# Patient Record
Sex: MALE | Race: WHITE | Employment: FULL TIME | URBAN - NONMETROPOLITAN AREA
[De-identification: names, ages, dates, MRNs, and addresses within clinical notes are randomized per-mention and may not be internally consistent; named-entity substitution may affect disease eponyms.]

---

## 2019-04-08 ENCOUNTER — HOSPITAL ENCOUNTER (EMERGENCY)
Age: 29
Discharge: HOME | End: 2019-04-08
Attending: EMERGENCY MEDICINE
Payer: COMMERCIAL

## 2019-04-08 VITALS
HEART RATE: 115 BPM | DIASTOLIC BLOOD PRESSURE: 85 MMHG | WEIGHT: 150 LBS | HEIGHT: 68 IN | OXYGEN SATURATION: 100 % | RESPIRATION RATE: 16 BRPM | SYSTOLIC BLOOD PRESSURE: 156 MMHG | BODY MASS INDEX: 22.73 KG/M2 | TEMPERATURE: 99.1 F

## 2019-04-08 DIAGNOSIS — B00.9 HERPES SIMPLEX: Primary | ICD-10-CM

## 2019-04-08 PROCEDURE — 87253 VIRUS INOCULATE TISSUE ADDL: CPT

## 2019-04-08 PROCEDURE — 6370000000 HC RX 637 (ALT 250 FOR IP): Performed by: EMERGENCY MEDICINE

## 2019-04-08 PROCEDURE — 99283 EMERGENCY DEPT VISIT LOW MDM: CPT

## 2019-04-08 PROCEDURE — 87252 VIRUS INOCULATION TISSUE: CPT

## 2019-04-08 RX ORDER — DEXTROAMPHETAMINE SACCHARATE, AMPHETAMINE ASPARTATE MONOHYDRATE, DEXTROAMPHETAMINE SULFATE AND AMPHETAMINE SULFATE 5; 5; 5; 5 MG/1; MG/1; MG/1; MG/1
CAPSULE, EXTENDED RELEASE ORAL
COMMUNITY
Start: 2019-02-06

## 2019-04-08 RX ORDER — VORTIOXETINE 10 MG/1
TABLET, FILM COATED ORAL
COMMUNITY
Start: 2019-04-04 | End: 2022-07-12

## 2019-04-08 RX ORDER — VALACYCLOVIR HYDROCHLORIDE 1 G/1
1000 TABLET, FILM COATED ORAL 2 TIMES DAILY
Qty: 20 TABLET | Refills: 0 | Status: SHIPPED | OUTPATIENT
Start: 2019-04-08 | End: 2019-04-18

## 2019-04-08 RX ORDER — ACYCLOVIR 200 MG/1
200 CAPSULE ORAL ONCE
Status: COMPLETED | OUTPATIENT
Start: 2019-04-08 | End: 2019-04-08

## 2019-04-08 RX ADMIN — ACYCLOVIR 200 MG: 200 CAPSULE ORAL at 22:18

## 2019-04-08 ASSESSMENT — PAIN DESCRIPTION - PAIN TYPE: TYPE: ACUTE PAIN

## 2019-04-08 ASSESSMENT — PAIN SCALES - GENERAL: PAINLEVEL_OUTOF10: 6

## 2019-04-08 ASSESSMENT — PAIN DESCRIPTION - LOCATION: LOCATION: GROIN

## 2019-04-08 ASSESSMENT — PAIN DESCRIPTION - DESCRIPTORS: DESCRIPTORS: CONSTANT;THROBBING

## 2019-04-08 ASSESSMENT — PAIN DESCRIPTION - ORIENTATION: ORIENTATION: LEFT

## 2019-04-09 NOTE — ED PROVIDER NOTES
Triage Chief Complaint:   No chief complaint on file. [place]:  Henry Ray is a 34 y.o. male that presents with a cluster of blisters on his penis. His blisters are painful and he has left inguinal lymphadenopathy as well. Symptoms began yesterday and seemed to be somewhat worse today. He did not have urethral discharge and does not have a history of STDs in the past.  He denies any recent sexual contact. Patient is otherwise healthy with hypertension as his only ongoing medical problem. ROS:  Review of systems was reviewed for 10 systems and is otherwise negative except as in the [address]    Past Medical History:   Diagnosis Date    Hypertension      Past Surgical History:   Procedure Laterality Date    NOSE SURGERY       History reviewed. No pertinent family history.   Social History     Socioeconomic History    Marital status: Single     Spouse name: Not on file    Number of children: Not on file    Years of education: Not on file    Highest education level: Not on file   Occupational History    Not on file   Social Needs    Financial resource strain: Not on file    Food insecurity:     Worry: Not on file     Inability: Not on file    Transportation needs:     Medical: Not on file     Non-medical: Not on file   Tobacco Use    Smoking status: Current Every Day Smoker     Packs/day: 1.00     Types: Cigarettes    Smokeless tobacco: Never Used   Substance and Sexual Activity    Alcohol use: Yes     Comment: rarely    Drug use: No    Sexual activity: Yes     Partners: Female   Lifestyle    Physical activity:     Days per week: Not on file     Minutes per session: Not on file    Stress: Not on file   Relationships    Social connections:     Talks on phone: Not on file     Gets together: Not on file     Attends Anabaptism service: Not on file     Active member of club or organization: Not on file     Attends meetings of clubs or organizations: Not on file     Relationship status: Not on file  Intimate partner violence:     Fear of current or ex partner: Not on file     Emotionally abused: Not on file     Physically abused: Not on file     Forced sexual activity: Not on file   Other Topics Concern    Not on file   Social History Narrative    Not on file     No current facility-administered medications for this encounter. Current Outpatient Medications   Medication Sig Dispense Refill    valACYclovir (VALTREX) 1 g tablet Take 1 tablet by mouth 2 times daily for 10 days 20 tablet 0    amphetamine-dextroamphetamine (ADDERALL XR) 20 MG extended release capsule       TRINTELLIX 10 MG TABS tablet        Allergies   Allergen Reactions    Lisinopril Other (See Comments)     cough    Ampicillin Rash     Nursing Notes Reviewed    Physical Exam:  ED Triage Vitals [04/08/19 2112]   Enc Vitals Group      BP (!) 156/85      Pulse 115      Resp 16      Temp 99.1 °F (37.3 °C)      Temp Source Oral      SpO2 100 %      Weight 150 lb (68 kg)      Height 5' 8\" (1.727 m)      Head Circumference       Peak Flow       Pain Score       Pain Loc       Pain Edu? Excl. in [address]? GENERAL APPEARANCE: A well-developed well-nourished anxious 78-year-old male in mild distress  HEAD: Normocephalic, atraumatic  : Normal circumcised male, a cluster of 1 mm blisters noted on the left shaft of the penis, no urethral discharge was noted. He did have left inguinal adenopathy but no evidence of an inguinal hernia, no testicular pain or swelling, and no other genital perineal skin lesions   SKIN: Warm and dry. Normal color, no rash,  capillary refill less than 2 seconds  MENTAL STATUS: Alert, oriented, interactive,     Nursing note and vital signs reviewed     I have reviewed and interpreted all of the currently available lab results from this visit (if applicable):  No results found for this visit on 04/08/19. Radiographs (if obtained):  ? Radiologist's Report Reviewed:  No orders to display       ?  Discussed with Radiologist:     ? The following radiograph was interpreted by myself in the absence of a radiologist:     EKG (if obtained): (All EKG's are interpreted by myself in the absence of a cardiologist)      MDM:   Patient with what clinically appears to be herpes simplex II. Culture was sent and he'll be treated with Valtrex twice a day and followed up with his family physician. No other STD was identified    Final Impression:  1. Herpes simplex        Critical Care:       Disposition referral (if applicable):  Gregg Hartmann MD  [phone]    In 1 day        Disposition medications (if applicable):  New Prescriptions    VALACYCLOVIR (VALTREX) 1 G TABLET    Take 1 tablet by mouth 2 times daily for 10 days       Comment: Please note this report has been produced using speech recognition software and may contain errors related to that system including errors in grammar, punctuation, and spelling, as well as words and phrases that may be inappropriate. If there are any questions or concerns please feel free to contact the dictating provider for clarification.       (Please note that portions of this note may have been completed with a voice recognition program. Efforts were made to edit the dictations but occasionally words are mis-transcribed.)    MD Anupam Garcia., MD  04/08/19 2204

## 2019-04-12 LAB
FINAL REPORT: NORMAL
PRELIMINARY: NORMAL

## 2022-02-24 ENCOUNTER — APPOINTMENT (OUTPATIENT)
Dept: GENERAL RADIOLOGY | Age: 32
End: 2022-02-24
Payer: COMMERCIAL

## 2022-02-24 ENCOUNTER — HOSPITAL ENCOUNTER (EMERGENCY)
Age: 32
Discharge: HOME | End: 2022-02-24
Payer: COMMERCIAL

## 2022-02-24 VITALS
DIASTOLIC BLOOD PRESSURE: 105 MMHG | HEIGHT: 68 IN | OXYGEN SATURATION: 100 % | TEMPERATURE: 96.8 F | HEART RATE: 81 BPM | RESPIRATION RATE: 16 BRPM | WEIGHT: 155 LBS | BODY MASS INDEX: 23.49 KG/M2 | SYSTOLIC BLOOD PRESSURE: 157 MMHG

## 2022-02-24 DIAGNOSIS — S92.335A CLOSED NONDISPLACED FRACTURE OF THIRD METATARSAL BONE OF LEFT FOOT, INITIAL ENCOUNTER: Primary | ICD-10-CM

## 2022-02-24 DIAGNOSIS — S61.212A LACERATION OF RIGHT MIDDLE FINGER WITHOUT FOREIGN BODY WITHOUT DAMAGE TO NAIL, INITIAL ENCOUNTER: ICD-10-CM

## 2022-02-24 DIAGNOSIS — S92.345A CLOSED NONDISPLACED FRACTURE OF FOURTH METATARSAL BONE OF LEFT FOOT, INITIAL ENCOUNTER: ICD-10-CM

## 2022-02-24 PROCEDURE — 99284 EMERGENCY DEPT VISIT MOD MDM: CPT

## 2022-02-24 PROCEDURE — 73630 X-RAY EXAM OF FOOT: CPT

## 2022-02-24 PROCEDURE — 6370000000 HC RX 637 (ALT 250 FOR IP): Performed by: NURSE PRACTITIONER

## 2022-02-24 PROCEDURE — 73552 X-RAY EXAM OF FEMUR 2/>: CPT

## 2022-02-24 PROCEDURE — 73130 X-RAY EXAM OF HAND: CPT

## 2022-02-24 RX ORDER — HYDROCODONE BITARTRATE AND ACETAMINOPHEN 5; 325 MG/1; MG/1
1 TABLET ORAL ONCE
Status: COMPLETED | OUTPATIENT
Start: 2022-02-24 | End: 2022-02-24

## 2022-02-24 RX ORDER — HYDROCODONE BITARTRATE AND ACETAMINOPHEN 5; 325 MG/1; MG/1
1 TABLET ORAL EVERY 6 HOURS PRN
Qty: 12 TABLET | Refills: 0 | Status: SHIPPED | OUTPATIENT
Start: 2022-02-24 | End: 2022-02-27

## 2022-02-24 RX ADMIN — HYDROCODONE BITARTRATE AND ACETAMINOPHEN 1 TABLET: 5; 325 TABLET ORAL at 12:42

## 2022-02-24 ASSESSMENT — ENCOUNTER SYMPTOMS
VOMITING: 0
SHORTNESS OF BREATH: 0
NAUSEA: 0
DIARRHEA: 0
SORE THROAT: 0
COUGH: 0
ABDOMINAL PAIN: 0
EYE PAIN: 0
BLOOD IN STOOL: 0
RHINORRHEA: 0
BACK PAIN: 0

## 2022-02-24 ASSESSMENT — PAIN SCALES - GENERAL
PAINLEVEL_OUTOF10: 7
PAINLEVEL_OUTOF10: 7
PAINLEVEL_OUTOF10: 5
PAINLEVEL_OUTOF10: 6

## 2022-02-24 ASSESSMENT — PAIN DESCRIPTION - DESCRIPTORS: DESCRIPTORS: BURNING

## 2022-02-24 ASSESSMENT — PAIN DESCRIPTION - LOCATION: LOCATION: LEG

## 2022-02-24 ASSESSMENT — PAIN DESCRIPTION - PAIN TYPE: TYPE: ACUTE PAIN

## 2022-02-24 ASSESSMENT — PAIN DESCRIPTION - ORIENTATION: ORIENTATION: LEFT

## 2022-02-24 NOTE — ED NOTES
Patient discharged in stable, ambulatory condition with all documented belongings. This nurse reviewed discharge instructions, pt verbalized understanding.        Saurabh Lundy RN  02/24/22 4870

## 2022-02-24 NOTE — ED NOTES
Perfect serve sent to [facility]     Call was returned by [facility] and spoke to Clark Crocker  02/24/22 2534

## 2022-02-24 NOTE — ED TRIAGE NOTES
Chief Complaint   Patient presents with    Hand Laceration     pt states machine dropped a several hundred pound piece of metal onto left leg and foot, also cut right hand, c/o pain in left leg    Leg Injury

## 2022-02-24 NOTE — Clinical Note
Sebastian Lobo was seen and treated in our emergency department on 2/24/2022. He may return to work on 03/03/2022. If you have any questions or concerns, please don't hesitate to call.       Roz Recinos, MIGUEL ANGEL - CNP

## 2022-02-24 NOTE — ED PROVIDER NOTES
[address] ED  EMERGENCY DEPARTMENT ENCOUNTER        Pt Name: Ayesha Malloy  MRN: [id number]  [place] 1990  Date of evaluation: 2/24/2022  Provider: MIGUEL ANGEL Joshi CNP  PCP: No primary care provider on file. Note Started: 12:28 PM EST      SIMONA. I have evaluated this patient. My supervising physician was available for consultation. Triage CHIEF COMPLAINT       Chief Complaint   Patient presents with    Hand Laceration     pt states machine dropped a several hundred pound piece of metal onto left leg and foot, also cut right hand, c/o pain in left leg    Leg Injury         HISTORY OF PRESENT ILLNESS   (Location/Symptom, Timing/Onset, Context/Setting, Quality, Duration, Modifying Factors, Severity)  Note limiting factors. Chief Complaint: Left leg pain and laceration to the right hand    Ayesha Malloy is a 28 y.o. male who presents to the emergency department with symptoms of left leg pain especially in the left foot after he was working today. Apparently a large piece of sheet metal fell from a magnetized lift causing him to land on his left foot. Patient states he now has a sense of tenderness across the dorsum of the foot. He also states that the object struck him in the thigh she has some mild tenderness in his left lower thigh. Patient states that he suffered some abrasions to the thigh and some bruising to the foot. He also suffered a small laceration involving the middle digit of the right hand. Nursing Notes were all reviewed and agreed with or any disagreements were addressed in the HPI. REVIEW OF SYSTEMS    (2-9 systems for level 4, 10 or more for level 5)     Review of Systems   Constitutional: Negative for chills, diaphoresis and fever. HENT: Negative for congestion, ear pain, rhinorrhea and sore throat. Eyes: Negative for pain and visual disturbance. Respiratory: Negative for cough and shortness of breath.     Cardiovascular: Negative for chest pain and leg swelling. Gastrointestinal: Negative for abdominal pain, blood in stool, diarrhea, nausea and vomiting. Genitourinary: Negative for difficulty urinating, dysuria, flank pain and frequency. Musculoskeletal: Negative for back pain and neck pain. Left foot pain and laceration to the middle digit of right hand    Skin: Negative for rash and wound. Neurological: Negative for dizziness and light-headedness. PAST MEDICAL HISTORY     Past Medical History:   Diagnosis Date    Hypertension        SURGICAL HISTORY     Past Surgical History:   Procedure Laterality Date    NOSE SURGERY         CURRENTMEDICATIONS       Discharge Medication List as of 2/24/2022  2:32 PM      CONTINUE these medications which have NOT CHANGED    Details   amphetamine-dextroamphetamine (ADDERALL XR) 20 MG extended release capsule Historical Med      TRINTELLIX 10 MG TABS tablet DAWHistorical Med             ALLERGIES     Amoxicillin, Lisinopril, and Ampicillin    FAMILYHISTORY     History reviewed. No pertinent family history.      SOCIAL HISTORY       Social History     Socioeconomic History    Marital status: Single     Spouse name: None    Number of children: None    Years of education: None    Highest education level: None   Occupational History    None   Tobacco Use    Smoking status: Current Every Day Smoker     Packs/day: 1.00     Types: Cigarettes    Smokeless tobacco: Never Used   Vaping Use    Vaping Use: Never used   Substance and Sexual Activity    Alcohol use: Not Currently     Comment: rarely    Drug use: No    Sexual activity: Yes     Partners: Female   Other Topics Concern    None   Social History Narrative    None     Social Determinants of Health     Financial Resource Strain:     Difficulty of Paying Living Expenses: Not on file   Food Insecurity:     Worried About Running Out of Food in the Last Year: Not on file    Alex of Food in the Last Year: Not on file Transportation Needs:     Lack of Transportation (Medical): Not on file    Lack of Transportation (Non-Medical): Not on file   Physical Activity:     Days of Exercise per Week: Not on file    Minutes of Exercise per Session: Not on file   Stress:     Feeling of Stress : Not on file   Social Connections:     Frequency of Communication with Friends and Family: Not on file    Frequency of Social Gatherings with Friends and Family: Not on file    Attends Jew Services: Not on file    Active Member of [address] [place] [address] or Organizations: Not on file    Attends Club or Organization Meetings: Not on file    Marital Status: Not on file   Intimate Partner Violence:     Fear of Current or Ex-Partner: Not on file    Emotionally Abused: Not on file    Physically Abused: Not on file    Sexually Abused: Not on file   Housing Stability:     Unable to Pay for Housing in the Last Year: Not on file    Number of [place] in the Last Year: Not on file    Unstable Housing in the Last Year: Not on file       SCREENINGS    [place] Coma Scale  Eye Opening: Spontaneous  Best Verbal Response: Oriented  Best Motor Response: Obeys commands  [place] Coma Scale Score: 15        PHYSICAL EXAM    (up to 7 for level 4, 8 or more for level 5)     ED Triage Vitals   BP Temp Temp Source Pulse Resp SpO2 Height Weight   02/24/22 1154 02/24/22 1153 02/24/22 1153 02/24/22 1153 02/24/22 1153 02/24/22 1153 02/24/22 1153 02/24/22 1153   (!) 159/100 96.8 °F (36 °C) Oral 85 16 100 % 5' 8\" (1.727 m) 155 lb (70.3 kg)       Physical Exam  Vitals and nursing note reviewed. Constitutional:       Appearance: Normal appearance. He is not toxic-appearing or diaphoretic. HENT:      Head: Normocephalic and atraumatic. Nose: Nose normal.      Mouth/Throat:      Mouth: Mucous membranes are moist.   Eyes:      General:         Right eye: No discharge. Left eye: No discharge.    Cardiovascular:      Rate and Rhythm: Normal rate and regular rhythm. Pulses: Normal pulses. Heart sounds: No murmur heard. Pulmonary:      Effort: Pulmonary effort is normal. No respiratory distress. Breath sounds: No wheezing or rhonchi. Abdominal:      Palpations: Abdomen is soft. Tenderness: There is no abdominal tenderness. There is no guarding or rebound. Musculoskeletal:         General: Swelling and tenderness present. Normal range of motion. Cervical back: Normal range of motion and neck supple. Left upper leg: Tenderness present. Left foot: Swelling, tenderness and bony tenderness present. No deformity or laceration. Normal pulse. Comments: Patient has 2+ dorsalis pedis and posterior tibial pulses involving the lower extremity. Patient has full range of motion of the digits of the toes. He has no numbness or tingling in the toes. No nail injury. Skin:     General: Skin is warm and dry. Neurological:      General: No focal deficit present. Mental Status: He is alert and oriented to person, place, and time. Psychiatric:         Mood and Affect: Mood normal.         Behavior: Behavior normal.         DIAGNOSTIC RESULTS   LABS:    Labs Reviewed - No data to display    When ordered, only abnormal lab results are displayed. All other labs were within normal range or not returned as of this dictation. EKG: When ordered, EKG's are interpreted by the Emergency Department Physician in the absence of a cardiologist.  Please see their note for interpretation of EKG.       RADIOLOGY:   Non-plain film images such as CT, Ultrasound and MRI are read by the radiologist. Plain radiographic images are visualized andpreliminarily interpreted by the  ED Provider with the below findings:        Interpretation perthe Radiologist below, if available at the time of this note:    XR HAND RIGHT (MIN 3 VIEWS)   Final Result   Soft tissue defect overlying the 3rd middle phalanx without underlying   fracture or radiopaque foreign body.         XR FEMUR LEFT (MIN 2 VIEWS)   Final Result   No acute osseous abnormality. XR FOOT LEFT (MIN 3 VIEWS)   Final Result   Nondisplaced fractures of the 3rd and 4th metatarsal necks. No results found. PROCEDURES   Unless otherwise noted below, none     Procedures    CRITICAL CARE TIME   N/A    CONSULTS:  IP CONSULT TO ORTHOPEDIC SURGERY      EMERGENCY DEPARTMENT COURSE and DIFFERENTIAL DIAGNOSIS/MDM:   Vitals:    Vitals:    02/24/22 1153 02/24/22 1154 02/24/22 1442   BP:  (!) 159/100 (!) 157/105   Pulse: 85  81   Resp: 16  16   Temp: 96.8 °F (36 °C)     TempSrc: Oral     SpO2: 100%  100%   Weight: 155 lb (70.3 kg)     Height: 5' 8\" (1.727 m)         Patient was given thefollowing medications:  Medications   HYDROcodone-acetaminophen (NORCO) 5-325 MG per tablet 1 tablet (1 tablet Oral Given 2/24/22 1242)           I had a detailed discussion with Mr. Anny Alcaraz who is the PA works an orthopedist.  The orthopedist advised that at this time patient can be discharged home. He advised that at this time he would go and place the patient in a walking boot. He advised that the patient does not need to be placed in a splint and if we have walking boots that would be preferred. He advised that patient can be followed up in the next few days by orthopedist.  Patient was provided a Ortho follow-up and provided follow-up with his family doctor. Patient displays no other acute complaints at this time states her last feeling well. The laceration finger was cleansed, irrigated, closed. The patient's fractures of foot display no associated bleeding, open injury, or further concerns for compartment syndrome. FINAL IMPRESSION      1. Closed nondisplaced fracture of third metatarsal bone of left foot, initial encounter    2. Closed nondisplaced fracture of fourth metatarsal bone of left foot, initial encounter    3.  Laceration of right middle finger without foreign body without damage to nail, initial encounter          DISPOSITION/PLAN   DISPOSITION Decision To Discharge 02/24/2022 02:20:23 PM      PATIENT REFERREDTO:  Bin Miranda MD  [address] [place] [address]  [phone]    Schedule an appointment as soon as possible for a visit       Primary care provider  Follow with your family doctor in the next 2 to 3 days for reevaluation of the foot and hand  Follow up with your primary care provider in the next 10 days for suture removal  Schedule an appointment as soon as possible for a visit         DISCHARGE MEDICATIONS:  Discharge Medication List as of 2/24/2022  2:32 PM      START taking these medications    Details   HYDROcodone-acetaminophen (NORCO) 5-325 MG per tablet Take 1 tablet by mouth every 6 hours as needed for Pain for up to 3 days. Intended supply: 3 days.  Take lowest dose possible to manage pain, Disp-12 tablet, R-0Normal             DISCONTINUED MEDICATIONS:  Discharge Medication List as of 2/24/2022  2:32 PM                 (Please note that portions ofthis note were completed with a voice recognition program.  Efforts were made to edit the dictations but occasionally words are mis-transcribed.)    MIGUEL ANGEL Brock CNP (electronically signed)            MIGUEL ANGEL Brock CNP  02/25/22 9014

## 2022-03-01 ENCOUNTER — OFFICE VISIT (OUTPATIENT)
Dept: ORTHOPEDIC SURGERY | Age: 32
End: 2022-03-01
Payer: COMMERCIAL

## 2022-03-01 VITALS — HEIGHT: 68 IN | BODY MASS INDEX: 23.57 KG/M2

## 2022-03-01 DIAGNOSIS — S70.12XA HEMATOMA OF LEFT THIGH, INITIAL ENCOUNTER: ICD-10-CM

## 2022-03-01 DIAGNOSIS — S92.342A CLOSED DISPLACED FRACTURE OF FOURTH METATARSAL BONE OF LEFT FOOT, INITIAL ENCOUNTER: ICD-10-CM

## 2022-03-01 DIAGNOSIS — S61.212A LACERATION OF RIGHT MIDDLE FINGER WITHOUT FOREIGN BODY WITHOUT DAMAGE TO NAIL, INITIAL ENCOUNTER: ICD-10-CM

## 2022-03-01 DIAGNOSIS — S92.332A DISPLACED FRACTURE OF THIRD METATARSAL BONE, LEFT FOOT, INITIAL ENCOUNTER FOR CLOSED FRACTURE: Primary | ICD-10-CM

## 2022-03-01 DIAGNOSIS — F17.200 CURRENT SMOKER: ICD-10-CM

## 2022-03-01 DIAGNOSIS — S97.82XA CRUSH INJURY OF FOOT, LEFT, INITIAL ENCOUNTER: ICD-10-CM

## 2022-03-01 PROCEDURE — 99406 BEHAV CHNG SMOKING 3-10 MIN: CPT | Performed by: ORTHOPAEDIC SURGERY

## 2022-03-01 PROCEDURE — 28470 CLTX METATARSAL FX WO MNP EA: CPT | Performed by: ORTHOPAEDIC SURGERY

## 2022-03-01 PROCEDURE — 99203 OFFICE O/P NEW LOW 30 MIN: CPT | Performed by: ORTHOPAEDIC SURGERY

## 2022-03-01 RX ORDER — CLINDAMYCIN HYDROCHLORIDE 300 MG/1
300 CAPSULE ORAL 4 TIMES DAILY
Qty: 40 CAPSULE | Refills: 0 | Status: SHIPPED | OUTPATIENT
Start: 2022-03-01 | End: 2022-03-11

## 2022-03-01 NOTE — LETTER
[address] [place] [address] Dr Morris [facility] [address]  Phone: [phone]  Fax: 1991 Love Shane MD        March 1, 2022     Patient: Zeny Johnson   YOB: 1990   Date of Visit: 3/1/2022       To Whom It May Concern: It is my medical opinion that Yarely Jones may return to work on 3-16-22 (off for 2 weeks). If you have any questions or concerns, please don't hesitate to call.     Sincerely,        Mode Michel MD

## 2022-03-04 ENCOUNTER — TELEPHONE (OUTPATIENT)
Dept: ORTHOPEDIC SURGERY | Age: 32
End: 2022-03-04

## 2022-03-04 NOTE — TELEPHONE ENCOUNTER
Faxed completed questionnaire to [facility] @ [phone] attn:  Elba Alfie    Received a request for records from [facility]. Waiting for 3/1/2022 dictation to drop into [facility].

## 2022-03-05 PROBLEM — F17.200 CURRENT SMOKER: Status: ACTIVE | Noted: 2022-03-05

## 2022-03-05 PROBLEM — S92.332A DISPLACED FRACTURE OF THIRD METATARSAL BONE, LEFT FOOT, INITIAL ENCOUNTER FOR CLOSED FRACTURE: Status: ACTIVE | Noted: 2022-03-05

## 2022-03-05 PROBLEM — S70.12XA HEMATOMA OF LEFT THIGH: Status: ACTIVE | Noted: 2022-03-05

## 2022-03-05 PROBLEM — S61.212A LACERATION OF RIGHT MIDDLE FINGER WITHOUT FOREIGN BODY WITHOUT DAMAGE TO NAIL: Status: ACTIVE | Noted: 2022-03-05

## 2022-03-05 PROBLEM — S92.342A CLOSED DISPLACED FRACTURE OF FOURTH METATARSAL BONE OF LEFT FOOT: Status: ACTIVE | Noted: 2022-03-05

## 2022-03-05 PROBLEM — S97.82XA CRUSH INJURY OF FOOT, LEFT, INITIAL ENCOUNTER: Status: ACTIVE | Noted: 2022-03-05

## 2022-03-05 NOTE — PROGRESS NOTES
CHIEF COMPLAINT:   1- Left foot pain/ 3rd and 4th MT neck minimally displaced fracture. 2- Crush injury of left foot. 3- Left thigh hematoma. 4- Right long finger volar laceration. DATE OF INJURY:  2/24/2022, Worker's Comp. HISTORY:  Mr. Ny Flores 28 y.o.   male  presents today for the first visit for evaluation of a left foot and thigh injury and also right long finger laceration which occurred when he was at at work and heavy magnet about 400-500 lb fell on him. He is complaining of left forefoot and thigh pain and swelling and also right long finger laceration. Rates pain a 8/10 VAS. This is better with elevation and worse with bearing any wt. The pain is sharp and not radiating. No other complaint. He was [facility] 1st at [facility], where he was x-rayed and splinted and asked to f/u with orthopaedics. Past Medical History:   Diagnosis Date    Hypertension        Past Surgical History:   Procedure Laterality Date    NOSE SURGERY         Social History     Socioeconomic History    Marital status: Single     Spouse name: Not on file    Number of children: Not on file    Years of education: Not on file    Highest education level: Not on file   Occupational History    Not on file   Tobacco Use    Smoking status: Current Every Day Smoker     Packs/day: 1.00     Types: Cigarettes    Smokeless tobacco: Never Used   Vaping Use    Vaping Use: Never used   Substance and Sexual Activity    Alcohol use: Yes     Comment: rarely    Drug use: No    Sexual activity: Yes     Partners: Female   Other Topics Concern    Not on file   Social History Narrative    Not on file     Social Determinants of Health     Financial Resource Strain:     Difficulty of Paying Living Expenses: Not on file   Food Insecurity:     Worried About Running Out of Food in the Last Year: Not on file    Alex of Food in the Last Year: Not on file   Transportation Needs:     Lack of Transportation (Medical):  Not on file  Lack of Transportation (Non-Medical): Not on file   Physical Activity:     Days of Exercise per Week: Not on file    Minutes of Exercise per Session: Not on file   Stress:     Feeling of Stress : Not on file   Social Connections:     Frequency of Communication with Friends and Family: Not on file    Frequency of Social Gatherings with Friends and Family: Not on file    Attends Buddhist Services: Not on file    Active Member of [address] [place] [address] or Organizations: Not on file    Attends Club or Organization Meetings: Not on file    Marital Status: Not on file   Intimate Partner Violence:     Fear of Current or Ex-Partner: Not on file    Emotionally Abused: Not on file    Physically Abused: Not on file    Sexually Abused: Not on file   Housing Stability:     Unable to Pay for Housing in the Last Year: Not on file    Number of [place] in the Last Year: Not on file    Unstable Housing in the Last Year: Not on file       History reviewed. No pertinent family history. Current Outpatient Medications on File Prior to Visit   Medication Sig Dispense Refill    amphetamine-dextroamphetamine (ADDERALL XR) 20 MG extended release capsule       TRINTELLIX 10 MG TABS tablet        No current facility-administered medications on file prior to visit. Pertinent items are noted in HPI  Review of systems reviewed from Patient History Form and available in the patient's chart under the Media tab. PHYSICAL EXAMINATION:  Mr. Edi Jordan is a very pleasant 28 y.o.  male who presents today in no acute distress, awake, alert, and oriented. He is well dressed, nourished and  groomed. Patient with normal affect. Height is  5' 8\" (1.727 m), weight is  , Body mass index is 23.57 kg/m². Resting respiratory rate is 16. Examination of the gait, showed that the patient walks with a crutch, PWB left leg.   Examination of both ankles showing a decreased range of motion of the left ankle and knee compare to the other side because of foot pain. There is moderate swelling that can be seen, as well as ecchymosis over dorsal side of the left foot and anterior thigh. He  has intact sensation and good pedal pulses. He has significant tenderness on deep palpation over the 3rd and 4th MT neck area left foot. Left hand with volar sutured laceration long finger, intact sensation and good cap refill. IMAGING: Christina Sanchez were reviewed, dated 2/24/2022, 3 views of the left foot, and showed a minimally displaced 3rd and 4th MT neck fracture. X-rays were taken in the ED 2/24/2022, 3 views of the right hand and 2 views left femur, and showed no fracture. No other abnormality. IMPRESSION:   1- Left foot pain/ 3rd and 4th MT neck minimally displaced fracture. 2- Crush injury of left foot. 3- Left thigh hematoma. 4- Right long finger volar laceration. PLAN: I discussed that the overall alignment of these fractures are good and that we can try to treat this non-operatively in a boot with WB. We discussed the risk of infection, nonunion and  malunion. Clindamycin PO Rx sent. We will see him  back next week at which time we will recheck finger laceration. The patient smokes cigarettes, and we discussed with the patient the risks of smoking on general health and also on bone and soft tissue healing (delay and non-union), and promised to cut down or stop smoking. Smoking: Educated the patient regarding the hazards of smoking and that it harms their body in many ways. It increases the chance of developing heart disease, lung disease, cancer, and other health problems including poor bone and wound healing. The importance of smoking cessation for optimal bone and wound healing was stressed. This was communicated verbally, 5 Minutes.       Tripp Ambriz MD

## 2022-03-08 ENCOUNTER — OFFICE VISIT (OUTPATIENT)
Dept: ORTHOPEDIC SURGERY | Age: 32
End: 2022-03-08
Payer: COMMERCIAL

## 2022-03-08 VITALS — WEIGHT: 155 LBS | BODY MASS INDEX: 23.49 KG/M2 | HEIGHT: 68 IN

## 2022-03-08 DIAGNOSIS — S61.212A LACERATION OF RIGHT MIDDLE FINGER WITHOUT FOREIGN BODY WITHOUT DAMAGE TO NAIL, INITIAL ENCOUNTER: ICD-10-CM

## 2022-03-08 DIAGNOSIS — S70.12XA HEMATOMA OF LEFT THIGH, INITIAL ENCOUNTER: ICD-10-CM

## 2022-03-08 DIAGNOSIS — S97.82XA CRUSH INJURY OF FOOT, LEFT, INITIAL ENCOUNTER: ICD-10-CM

## 2022-03-08 DIAGNOSIS — S92.332A DISPLACED FRACTURE OF THIRD METATARSAL BONE, LEFT FOOT, INITIAL ENCOUNTER FOR CLOSED FRACTURE: Primary | ICD-10-CM

## 2022-03-08 DIAGNOSIS — F17.200 CURRENT SMOKER: ICD-10-CM

## 2022-03-08 PROCEDURE — 99406 BEHAV CHNG SMOKING 3-10 MIN: CPT | Performed by: ORTHOPAEDIC SURGERY

## 2022-03-08 PROCEDURE — 99024 POSTOP FOLLOW-UP VISIT: CPT | Performed by: ORTHOPAEDIC SURGERY

## 2022-03-08 PROCEDURE — 99214 OFFICE O/P EST MOD 30 MIN: CPT | Performed by: ORTHOPAEDIC SURGERY

## 2022-03-08 NOTE — LETTER
[address] [place] [address] [facility] [address]  Phone: [phone]  Fax: 1991 Love Shane MD        March 8, 2022     Patient: Ronald Peters   YOB: 1990   Date of Visit: 3/8/2022       To Whom It May Concern: It is my medical opinion that Juan Miguel Moulton should remain out of work until 3/15/2022. Patient can then return to work light duty for 2 weeks. If you have any questions or concerns, please don't hesitate to call.     Sincerely,        Katarina Anthony MD

## 2022-03-09 ENCOUNTER — TELEPHONE (OUTPATIENT)
Dept: ORTHOPEDIC SURGERY | Age: 32
End: 2022-03-09

## 2022-03-09 NOTE — TELEPHONE ENCOUNTER
Other  [id number] update Marcella Loomis [id number] request for RESTRICTIONS explained per [employer]    Please notify Teetee Lockett in Workers Comp when complete

## 2022-03-13 NOTE — PROGRESS NOTES
CHIEF COMPLAINT:   1- Left foot pain/ 3rd and 4th MT neck minimally displaced fracture. 2- Crush injury of left foot. 3- Left thigh hematoma. 4- Right long finger volar laceration. DATE OF INJURY:  2/24/2022, Worker's Comp. HISTORY:  Mr. Serina Ray 28 y.o.   male  presents today for the first visit for evaluation of a left foot and thigh injury and also right long finger laceration which occurred when he was at at work and heavy magnet about 400-500 lb fell on him. He is complaining of left forefoot and thigh pain and swelling and also right long finger laceration. Rates pain a 8/10 VAS. This is better with elevation and worse with bearing any wt. The pain is sharp and not radiating. No other complaint. He was seen 1st at [facility], where he was x-rayed and splinted and asked to f/u with orthopaedics. Past Medical History:   Diagnosis Date    Hypertension        Past Surgical History:   Procedure Laterality Date    NOSE SURGERY         Social History     Socioeconomic History    Marital status: Single     Spouse name: Not on file    Number of children: Not on file    Years of education: Not on file    Highest education level: Not on file   Occupational History    Not on file   Tobacco Use    Smoking status: Current Every Day Smoker     Packs/day: 1.00     Types: Cigarettes    Smokeless tobacco: Never Used   Vaping Use    Vaping Use: Never used   Substance and Sexual Activity    Alcohol use: Yes     Comment: rarely    Drug use: No    Sexual activity: Yes     Partners: Female   Other Topics Concern    Not on file   Social History Narrative    Not on file     Social Determinants of Health     Financial Resource Strain:     Difficulty of Paying Living Expenses: Not on file   Food Insecurity:     Worried About Running Out of Food in the Last Year: Not on file    Alex of Food in the Last Year: Not on file   Transportation Needs:     Lack of Transportation (Medical):  Not on file  Lack of Transportation (Non-Medical): Not on file   Physical Activity:     Days of Exercise per Week: Not on file    Minutes of Exercise per Session: Not on file   Stress:     Feeling of Stress : Not on file   Social Connections:     Frequency of Communication with Friends and Family: Not on file    Frequency of Social Gatherings with Friends and Family: Not on file    Attends Mosque Services: Not on file    Active Member of [address] [place] [address] or Organizations: Not on file    Attends Club or Organization Meetings: Not on file    Marital Status: Not on file   Intimate Partner Violence:     Fear of Current or Ex-Partner: Not on file    Emotionally Abused: Not on file    Physically Abused: Not on file    Sexually Abused: Not on file   Housing Stability:     Unable to Pay for Housing in the Last Year: Not on file    Number of [place] in the Last Year: Not on file    Unstable Housing in the Last Year: Not on file       History reviewed. No pertinent family history. Current Outpatient Medications on File Prior to Visit   Medication Sig Dispense Refill    amphetamine-dextroamphetamine (ADDERALL XR) 20 MG extended release capsule       TRINTELLIX 10 MG TABS tablet        No current facility-administered medications on file prior to visit. Pertinent items are noted in HPI  Review of systems reviewed from Patient History Form and available in the patient's chart under the Media tab. PHYSICAL EXAMINATION:  Mr. Barney Berry is a very pleasant 28 y.o.  male who presents today in no acute distress, awake, alert, and oriented. He is well dressed, nourished and  groomed. Patient with normal affect. Height is  5' 8\" (1.727 m), weight is 155 lb (70.3 kg), Body mass index is 23.57 kg/m². Resting respiratory rate is 16. Examination of the gait, showed that the patient walks with a boot, WB left leg.   Examination of both ankles showing a decreased range of motion of the left ankle and knee

## 2022-03-15 ENCOUNTER — TELEPHONE (OUTPATIENT)
Dept: ORTHOPEDIC SURGERY | Age: 32
End: 2022-03-15

## 2022-03-15 NOTE — TELEPHONE ENCOUNTER
Patient reports swelling and limited range of motion. Some numbness/tingling in tip of finger is a new onset. Good coloring of skin reported by patient. Informed him to try to manage the swelling with ice and elevation and monitor the numbness; numbness may resolve if swelling decreases. OK to keep current follow up on 3/22/22. Recommended patient call back to be seen sooner if condition worsens.

## 2022-03-15 NOTE — TELEPHONE ENCOUNTER
WC patient requesting a call about his left middle finger. He states it is numb at the tip and he cannot bend it all the way.  Please contact him at [phone]

## 2022-03-21 ENCOUNTER — TELEPHONE (OUTPATIENT)
Dept: ORTHOPEDIC SURGERY | Age: 32
End: 2022-03-21

## 2022-03-22 ENCOUNTER — OFFICE VISIT (OUTPATIENT)
Dept: ORTHOPEDIC SURGERY | Age: 32
End: 2022-03-22
Payer: COMMERCIAL

## 2022-03-22 DIAGNOSIS — F17.200 CURRENT SMOKER: ICD-10-CM

## 2022-03-22 DIAGNOSIS — S61.219D: ICD-10-CM

## 2022-03-22 DIAGNOSIS — S92.342D CLOSED DISPLACED FRACTURE OF FOURTH METATARSAL BONE OF LEFT FOOT WITH ROUTINE HEALING, SUBSEQUENT ENCOUNTER: Primary | ICD-10-CM

## 2022-03-22 PROCEDURE — 99406 BEHAV CHNG SMOKING 3-10 MIN: CPT | Performed by: ORTHOPAEDIC SURGERY

## 2022-03-22 PROCEDURE — L3260 AMBULATORY SURGICAL BOOT EAC: HCPCS | Performed by: ORTHOPAEDIC SURGERY

## 2022-03-22 PROCEDURE — 99214 OFFICE O/P EST MOD 30 MIN: CPT | Performed by: ORTHOPAEDIC SURGERY

## 2022-03-22 NOTE — PROGRESS NOTES
CHIEF COMPLAINT:   1- Left foot pain/ 3rd and 4th MT neck minimally displaced fracture. 2- Crush injury of left foot. 3- Left thigh hematoma. 4- Right long finger volar laceration. DATE OF INJURY:  2/24/2022, Worker's Comp. HISTORY:  Mr. Warner Fletcher 28 y.o.   male  presents today for f/u evaluation of a left foot and thigh injury and also right long finger laceration which occurred when he was at at work and heavy magnet about 400-500 lb fell on him. He is complaining of left forefoot and thigh pain and swelling and also right long finger laceration. Rates pain a 5/10 VAS. This is better with elevation and worse with bearing any wt. The pain is sharp and not radiating. No other complaint. He was seen 1st at [facility], where he was x-rayed and splinted and asked to f/u with orthopaedics. Past Medical History:   Diagnosis Date    Hypertension        Past Surgical History:   Procedure Laterality Date    NOSE SURGERY         Social History     Socioeconomic History    Marital status: Single     Spouse name: Not on file    Number of children: Not on file    Years of education: Not on file    Highest education level: Not on file   Occupational History    Not on file   Tobacco Use    Smoking status: Current Every Day Smoker     Packs/day: 1.00     Types: Cigarettes    Smokeless tobacco: Never Used   Vaping Use    Vaping Use: Never used   Substance and Sexual Activity    Alcohol use: Yes     Comment: rarely    Drug use: No    Sexual activity: Yes     Partners: Female   Other Topics Concern    Not on file   Social History Narrative    Not on file     Social Determinants of Health     Financial Resource Strain:     Difficulty of Paying Living Expenses: Not on file   Food Insecurity:     Worried About Running Out of Food in the Last Year: Not on file    Alex of Food in the Last Year: Not on file   Transportation Needs:     Lack of Transportation (Medical):  Not on file    Lack of Transportation (Non-Medical): Not on file   Physical Activity:     Days of Exercise per Week: Not on file    Minutes of Exercise per Session: Not on file   Stress:     Feeling of Stress : Not on file   Social Connections:     Frequency of Communication with Friends and Family: Not on file    Frequency of Social Gatherings with Friends and Family: Not on file    Attends Yazdanism Services: Not on file    Active Member of [address] [place] [address] or Organizations: Not on file    Attends Club or Organization Meetings: Not on file    Marital Status: Not on file   Intimate Partner Violence:     Fear of Current or Ex-Partner: Not on file    Emotionally Abused: Not on file    Physically Abused: Not on file    Sexually Abused: Not on file   Housing Stability:     Unable to Pay for Housing in the Last Year: Not on file    Number of [place] in the Last Year: Not on file    Unstable Housing in the Last Year: Not on file       No family history on file. Current Outpatient Medications on File Prior to Visit   Medication Sig Dispense Refill    amphetamine-dextroamphetamine (ADDERALL XR) 20 MG extended release capsule       TRINTELLIX 10 MG TABS tablet        No current facility-administered medications on file prior to visit. Pertinent items are noted in HPI  Review of systems reviewed from Patient History Form and available in the patient's chart under the Media tab. PHYSICAL EXAMINATION:  Mr. Suleman Cortez is a very pleasant 28 y.o.  male who presents today in no acute distress, awake, alert, and oriented. He is well dressed, nourished and  groomed. Patient with normal affect. Height is   , weight is  , There is no height or weight on file to calculate BMI. Resting respiratory rate is 16. Examination of the gait, showed that the patient walks with a boot, WB left leg.   Examination of both ankles showing a decreased range of motion of the left ankle and knee compare to the other side because of foot pain. There is moderate swelling that can be seen, as well as ecchymosis over dorsal side of the left foot and anterior thigh. He  has intact sensation and good pedal pulses. He has significant tenderness on deep palpation over the 3rd and 4th MT neck area left foot. Left hand with volar sutured laceration long finger, intact sensation and good cap refill. Sutures removed 3/8/2022. IMAGING: Delano Prince were reviewed, taken today in the office, 3 views of the left foot, and showed a minimally displaced 3rd and 4th MT neck fracture. X-rays were taken in the ED 2/24/2022, 3 views of the right hand and 2 views left femur, and showed no fracture. No other abnormality. IMPRESSION:   1- Left foot pain/ 3rd and 4th MT neck minimally displaced fracture. 2- Crush injury of left foot. 3- Left thigh hematoma. 4- Right long finger volar laceration. PLAN: I discussed that the overall alignment of these fractures are still good and that we can continue to treat this non-operatively in a boot with WB. We discussed the risk of infection, nonunion and  malunion. Clindamycin PO Rx was sent 3/1/2022. Ice left thigh PRN, ROM right hand long finger. We will see him  back in 4 week at which time we will re-evaluated wit new xray left foot. The patient smokes cigarettes, and we discussed with the patient the risks of smoking on general health and also on bone and soft tissue healing (delay and non-union), and promised to cut down or stop smoking. Smoking: Educated the patient regarding the hazards of smoking and that it harms their body in many ways. It increases the chance of developing heart disease, lung disease, cancer, and other health problems including poor bone and wound healing. The importance of smoking cessation for optimal bone and wound healing was stressed. This was communicated verbally, 5 Minutes.       Alycia Naylor MD

## 2022-03-22 NOTE — LETTER
[address] [place] [address] [address] [place] [address]  Phone: [phone]  Fax: Rafael Gordon MD        March 22, 2022     Patient: Rush Cárdenas   YOB: 1990   Date of Visit: 3/22/2022       To Whom It May Concern: It is my medical opinion that Zoe Batch may return to light duty immediately with the following restrictions: light duty for 4 weeks. No lifting greater than 5 pounds with the right hand. If you have any questions or concerns, please don't hesitate to call.     Sincerely,        Nayan Tirado MD

## 2022-03-25 PROBLEM — S61.219A: Status: ACTIVE | Noted: 2022-03-05

## 2022-04-05 ENCOUNTER — HOSPITAL ENCOUNTER (OUTPATIENT)
Dept: OCCUPATIONAL THERAPY | Age: 32
Setting detail: THERAPIES SERIES
Discharge: HOME | End: 2022-04-05
Payer: COMMERCIAL

## 2022-04-05 PROCEDURE — 97165 OT EVAL LOW COMPLEX 30 MIN: CPT | Performed by: OCCUPATIONAL THERAPIST

## 2022-04-05 PROCEDURE — 97022 WHIRLPOOL THERAPY: CPT | Performed by: OCCUPATIONAL THERAPIST

## 2022-04-05 PROCEDURE — 97112 NEUROMUSCULAR REEDUCATION: CPT | Performed by: OCCUPATIONAL THERAPIST

## 2022-04-05 NOTE — FLOWSHEET NOTE
finger got trapped when lifting 700lb door with magnets that came loose.     Pain Scale: 4/10          [x]? Constant                []?Intermittent              []?other:  Pain Location:  Long finger  Easing factors: rest  Provocative factors: use      OBJECTIVE:   Date:  Hand Dominance:     [x]? Right    []? Left 4/5/2022      Objective Measures:     PAIN 4/10   Quick DASH 36 %   Digit  ROM         Index       WFL:                              Long WFL                              Ring WFL                              Small WFL   Digits tip to DPFC in cm WFL   Thumb ROM MP  IP   Thumb opposition  R:  L:   Thumb Radial/Palmar abd ROM R:  L:   Wrist ROM Ext/Flex WFL   Rad/Uln dev ROM R:  L:   Edema in cm circumf. MCPJs R:  L:   Edema in cm circumf.   Wrist R:  L:    strength in lbs R:99#  L:126#   Pinch Strengthin lbs: lat  R:  L:   Pinch Strength in lbs:  3 point R:  L:      MMT:      Observations:  (including splints, bandages, incisions, scars):              MODALITIES: 4/5/22      Fluidotherapy (80613)      Estim (16869/09823)      Paraffin (06599)      US (52868)      Iontophoresis (86266)      Hot Pack      Cold Pack            INTERVENTIONS:                                                                                    Manual Therapy (53578)      (IASTM, Dry Needling, manual mobilization)            Splinting      Lcode:      Orthotic Mgmt, Subsequent Enc (71019)      Orthotic Mgmt & Training (57630)            Other:              Therapeutic Exercise & NMR:  [] (47628) Provided verbal/tactile cueing for activities related to strengthening, flexibility, endurance, ROM  for improvements in scapular, scapulothoracic and UE control with self care, reaching, carrying, lifting, house/yardwork, driving/computer work.    [] (73043) Provided verbal/tactile cueing for activities related to improving balance, coordination, kinesthetic sense, posture, motor skill, proprioception  to assist with  scapular, scapulothoracic and UE control with self care, reaching, carrying, lifting, house/yardwork, driving/computer work.     Therapeutic Activities & NMR:    [] (66831 or 00188) Provided verbal/tactile cueing for activities related to improving balance, coordination, kinesthetic sense, posture, motor skill, proprioception and motor activation to allow for proper function of scapular, scapulothoracic and UE control with self care, carrying, lifting, driving/computer work    Home Exercise Program:    [] (93140) Reviewed/Progressed HEP activities related to strengthening, flexibility, endurance, ROM of scapular, scapulothoracic and UE control with self care, reaching, carrying, lifting, house/yardwork, driving/computer work  [] (00275) Reviewed/Progressed HEP activities related to improving balance, coordination, kinesthetic sense, posture, motor skill, proprioception of scapular, scapulothoracic and UE control with self care, reaching, carrying, lifting, house/yardwork, driving/computer work      Manual Treatments:  PROM / STM / Oscillations-Mobs:  G-I, II, III, IV (PA's, Inf., Post.)  [] (61899) Provided manual therapy to mobilize soft tissue/joints of cervical/CT, scapular GHJ and UE for the purpose of modulating pain, promoting relaxation,  increasing ROM, reducing/eliminating soft tissue swelling/inflammation/restriction, improving soft tissue extensibility and allowing for proper ROM for normal function with self care, reaching, carrying, lifting, house/yardwork, driving/computer work    ADL Training:  [] (21490) Provided self-care/home management training related to activities of daily living and compensatory training, and/or use of adaptive equipment      Charges:  Timed Code Treatment Minutes: 15   Total Treatment Minutes: 50   Worker's Comp: Time In/Time Out     [x] EVAL (LOW) 27624 (typically 20 minutes face-to-face)    [] EVAL (MOD) 96895 (typically 30 minutes face-to-face)  [] EVAL (HIGH) 12167 (typically 45 []? Adjusted          Overall Progression Towards Functional Goals/Treatment Progress Update:  [] Patient is progressing as expected towards functional goals listed. [] Progression is slowed due to complexities/impairments listed. [] Progression has been slowed due to co-morbidities. [x] Plan just implemented, too soon to assess goals progression <30 days  [] Goals require adjustment due to lack of progress  [] Patient is not progressing as expected and requires additional follow up with physician  [] All goals are met  [] Other:     Prognosis for POC: [x] Good [] Fair  [] Poor    Patient requires continued skilled intervention: [x] Yes  [] No    Treatment/Activity Tolerance:  [x] Patient able to complete treatment  [] Patient limited by fatigue  [] Patient limited by pain    [] Patient limited by other medical complications  [] Other:                  PLAN: See eval  [] Continue per plan of care [] Alter current plan (see comments above)  [x] Plan of care initiated [] Hold pending MD visit [] Discharge      Electronically signed by:  Jama DARLING/L [id number]    Note: If patient does not return for scheduled/ recommended follow up visits, this note will serve as a discharge from care along with most recent update on progress.   Phone: [phone]  Fax [phone]

## 2022-04-05 NOTE — PLAN OF CARE
[address] [place], [address] [place] [address] [address]  Phone: [phone] Fax: [phone]     Occupational Therapy/Hand Therapy Certification      Dear  Ashley Calncy    We had the pleasure of evaluating the following patient for occupational therapy services at [address] [place] [address]. A summary of our findings can be found in the initial assessment below. This includes our plan of care. If you have any questions or concerns regarding these findings, please do not hesitate to contact me at the office phone number checked above. Thank you for the referral.     Physician Signature:_______________________________Date:__________________  By signing above (or electronic signature), therapists plan is approved by physician      Patient: Ana Carrillo   : 1990   MRN: [id number]  Referring Physician:  Ashley Clancy      Evaluation Date: 2022      Medical Diagnosis Information:           P53.998A (ICD-10-CM) - Laceration of finger of right hand without damage to nail, foreign body presence unspecified    Date of Injury: 22  Date of Surgery: N/A                                  Insurance information:  [id number]    Date of Patient follow up with Physician: 22    [x] Patient reported history, allergies, and medications reviewed - see intake form. Preferred Language for Healthcare:   [x]English       []other:       RESTRICTIONS/PRECAUTIONS: Pt not to lift more than 5 lbs    Latex Allergy:  [x]No      []Yes  Pacemaker:  [x] No       [] Yes       Functional Scale: 36% (Quick DASH)   Date assessed:  2022      C-SSRS Triggered by Intake questionnaire (Past 2 wk assessment):    No, Questionnaire did not trigger screening.     Yes, Patient intake triggered further evaluation       C-SSRS Screening completed   PCP notified via Plan of Care    Emergency services notified    SUBJECTIVE: Patient reported deficits/history of current problem: Pt injured when his finger got trapped when lifting 700lb door with magnets that came loose. Pain Scale: 4/10  [x]Constant      []Intermittent    []other:  Pain Location:  Long finger  Easing factors: rest  Provocative factors: use     OBJECTIVE:   Date:  Hand Dominance:     [x]  Right    [] Left 4/5/2022     Objective Measures:    PAIN 4/10   Quick DASH 36 %   Digit  ROM         Index     WFL:                              Long WFL                              Ring WFL                              Small WFL   Digits tip to DPFC in cm WFL   Thumb ROM MP  IP   Thumb opposition  R:  L:   Thumb Radial/Palmar abd ROM R:  L:   Wrist ROM Ext/Flex WFL   Rad/Uln dev ROM R:  L:   Edema in cm circumf. MCPJs R:  L:   Edema in cm circumf.   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 on light duty    Recreational Activities/Meaningful Interests: fishing, walking; responsible for yardwork also    Prior Level of Function: [x] Independent with ADLs/IADLs     [] Assistance needed (describe):    Patient-Identified Primary Performance Deficits (to be addressed in POC):   [] bathing    [x] household tasks (cooking/cleaning)   [] dressing    [] self feeding   [] grooming    [x] work/education   [] functional mobility   [] sleeping/rest   [] toileting/hygiene   [x] recreational activities   [] driving    [] community/social participation   [] other:     Comorbidities Affecting Functional Performance:     [x]Anxiety (F41.9)/Depression (F32.9)   []Diabetes Type 1(E10.65) or 2 (E11.65)   []Rheumatoid Arthritis (M05.9)  []Fibromyalgia (M79.7)  []Neuropathy(G60.9)  []Osteoarthritis(M19.91)  []None   []Other:    Functional Mobility/Transfers/Gait:  [x] Independent - no significant gait deviations  [] Assistance needed   [] Assistive device used: Falls Risk Assessment (30 days):   [x] Falls Risk assessed and no intervention required. [] Falls Risk assessed and Patient requires intervention due to being higher risk   TUG score (>12s at risk):     [] Falls education provided, including      Review Of Systems (ROS): [x]Performed Review of systems (Integumentary, CardioPulmonary, Neurological) by intake and observation. Intake form has been scanned into medical record. Patient has been instructed to contact their primary care physician regarding ROS issues if not already being addressed at this time. ASSESSMENT:   This patient presents with signs and symptoms consistent with the medical diagnosis provided by the referring physician.      Impairments (physical, cognitive and/or psychosocial):  [] Decreased mobility   [x] Weakness    [] Hypersensitivity   [x] Pain/tenderness   [] Edema/swelling   [] Decreased coordination (fine/gross motor)   [] Impaired body mechanics  [x] Sensory loss  [] Loss of balance   [] Other:      Performance Deficits (to be addressed in plan of care):   [] Bathing    [x] Household Tasks (cooking/cleaning)   [] Dressing    [] Self Feeding   [] Grooming    [x] Work/Education   [] Functional Mobility   [] Sleeping/Rest   [] Toileting/Hygiene   [] Recreational Activities   [] Driving    [] Community/Social Participation   [] Other:     Rehab Potential:   [] Excellent [x] Good [] Fair  [] Poor     Barriers affecting rehab potential:  []Age    []Lack of Motivation   [x]Co-Morbidities  []Cognitive Function  []Environmental/home/work barriers  []Other: Tolerance of evaluation/treatment:    [] Excellent [x] Good [] Fair  [] Poor    PLAN OF CARE:  Interventions:   [x] Therapeutic Exercise [x] Therapeutic Activity    [x] Activities of Daily Living [x] Neuromuscular Re-education      [x] Patient Education  [x] Manual Therapy      [x] Modalities as needed, and not otherwise contraindicated, including: ultrasound,paraffin,moist heat/cold pack, electrical stimulation, contrast bath, iontophoresis  [] Splinting    Frequency/Duration:  2 days per week for 4-6 weeks      GOALS:  Patient stated goal: to have full use of hand    [] Progressing: [] Met: [] Not Met: [] Adjusted    Therapist goals for Patient:   Short Term Goals: To be achieved in: 2 weeks  1. Independent in HEP and progression per patient tolerance, in order to prevent re-injury. [] Progressing: [] Met: [] Not Met: [] Adjusted   2. Patient will have a decrease in pain to facilitate improvement in movement, function, and ADLs as indicated by Functional Deficits. [] Progressing: [] Met: [] Not Met: [] Adjusted    Long Term Goals to be achieved in 6 weeks (through 5/17/22), including patient directed goals to address patient identified performance deficits:  1) Pt to be independent in graded HEP progression with a good level of effort and compliance. [] Progressing: [] Met: [] Not Met: [] Adjusted   2) Pt to report a score of </= 25 % on the Quick DASH disability questionnaire for increased performance with carrying, moving, and handling objects. [] Progressing: [] Met: [] Not Met: [] Adjusted   3) Pt will demonstrate increased  strength by 5# for improved independence with home mgt and vocational tasks. [] Progressing: [] Met: [] Not Met: [] Adjusted   5) Pt will have a decrease in pain to 2/10 to facilitate home mgt and vocational tasks.   [] Progressing: [] Met: [] Not Met: [] Adjusted        OCCUPATIONAL THERAPY EVALUATION COMPLEXITY JUSTIFICATION:    [x] An occupational profile and medical/therapy history, which includes:   [x] a brief history including medical and/or therapy records relating to the     presenting problem   [] an expanded review of medical and/or therapy records and additional review     of physical, cognitive or psychosocial history related to current functional    performance   [] an extensive additional review of review of medical and/or therapy records   and physical, cognitive, or psychosocial history related to current    functional performance    [x] An assessment that identifies performance deficits (relating to physical, cognitive, or psychosocial skills) that result in activity limitations and/or participation restrictions:   [x] 1-3 performance deficits   [] 3-5 performance deficits   [] 5 or more performance deficits    [x] Clinical decision making of:   [x] low complexity, including analysis of occupational profile, data analysis from problem focused assessment, and consideration of a limited number of treatment options. No comorbidities affect occupational performance. No task modifications or assistance needed to complete evaluation. [] moderate complexity, including analysis of occupational profile, data analysis from detailed assessment and consideration of several treatment options. Comorbidities that affect occupational performance may be present. Minimal to moderate task modifications or assistance needed to complete assessment. [] high complexity, including analysis of occupational profile, analysis of data from comprehensive assessment and consideration of multiple treatment options. Multiple comorbidities present that affect occupational performance. Significant task modifications or assistance needed to complete assessment.     Evaluation Code:  [x] Low Complexity EVAL 46013 (typically 30 minutes face to face)  [] Mod Complexity EVAL 65723 (typically 45 minutes face to face)  [] High Complexity EVAL 85304 (typically 60 minutes face to face)    Electronically signed by: Josie Mchugh OTR/L [id number]

## 2022-04-08 ENCOUNTER — TELEPHONE (OUTPATIENT)
Dept: ORTHOPEDIC SURGERY | Age: 32
End: 2022-04-08

## 2022-04-11 ENCOUNTER — HOSPITAL ENCOUNTER (OUTPATIENT)
Dept: OCCUPATIONAL THERAPY | Age: 32
Setting detail: THERAPIES SERIES
Discharge: HOME | End: 2022-04-11
Payer: COMMERCIAL

## 2022-04-11 PROCEDURE — 97110 THERAPEUTIC EXERCISES: CPT | Performed by: OCCUPATIONAL THERAPIST

## 2022-04-11 PROCEDURE — 97035 APP MDLTY 1+ULTRASOUND EA 15: CPT | Performed by: OCCUPATIONAL THERAPIST

## 2022-04-11 PROCEDURE — 97140 MANUAL THERAPY 1/> REGIONS: CPT | Performed by: OCCUPATIONAL THERAPIST

## 2022-04-11 PROCEDURE — 97022 WHIRLPOOL THERAPY: CPT | Performed by: OCCUPATIONAL THERAPIST

## 2022-04-11 NOTE — FLOWSHEET NOTE
[address] [place], [address] [place] [address] [address]  Phone: [phone] Fax: [phone]     Occupational Therapy Treatment Note/ Progress Report:     Is this a Progress Report:     []  Yes  [x]  No      If Yes:  Date Range for reporting period:  Beginning 22    Ending    Progress report will be due (10 Rx or 30 days whichever is less):     Recertification will be due (POC Duration  / 90 days whichever is less): 22   Patient: Ana Carrillo                          : 1990                                    MRN: [id number]  Referring Physician:  Ashley Clancy                                        Evaluation Date: 2022                                           Medical Diagnosis Information:               B73.796J (ICD-10-CM) - Laceration of finger of right hand without damage to nail, foreign body presence unspecified    Date of Injury: 22  Date of Surgery: N/A                                  Insurance information:  [id number]     Date of Patient follow up with Physician: 22Has the plan of care been signed (Y/N):        []  Yes  [x]  No       Visit # Insurance Allowable Auth Required   2 ? []  Yes []  No    From 22  to 2022      Preferred Language for Healthcare:   [x]English       []other:     RESTRICTIONS/PRECAUTIONS: Pt not to lift more than 5 lbs     Latex Allergy:  [x]? No      []? Yes                    Pacemaker:  [x]? No       []?  Yes         Functional Scale: 36% (Quick DASH)                                 Date assessed:  2022        C-SSRS Triggered by Intake questionnaire (Past 2 wk assessment):    No, Questionnaire did not trigger screening.    Yes, Patient intake triggered further evaluation       C-SSRS Screening completed   PCP notified via Plan of Care               Emergency services notified     SUBJECTIVE: Pt reports that he has been doing exercises and he has been wearing finger sleeve with gel pad which has been helpful. Patient reported deficits/history of current problem: Pt injured when his finger got trapped when lifting 700lb door with magnets that came loose.     Pain Scale: 4/10          [x]? Constant                []?Intermittent              []?other:  Pain Location:  Long finger  Easing factors: rest  Provocative factors: use      OBJECTIVE:   Date:  Hand Dominance:     [x]? Right    []? Left 4/5/2022      Objective Measures:     PAIN 4/10   Quick DASH 36 %   Digit  ROM         Index       WFL:                              Long WFL                              Ring WFL                              Small WFL   Digits tip to DPFC in cm WFL   Thumb ROM MP  IP   Thumb opposition  R:  L:   Thumb Radial/Palmar abd ROM R:  L:   Wrist ROM Ext/Flex WFL   Rad/Uln dev ROM R:  L:   Edema in cm circumf. MCPJs R:  L:   Edema in cm circumf.   Wrist R:  L:    strength in lbs R:99#  L:126#   Pinch Strengthin lbs: lat  R:  L:   Pinch Strength in lbs:  3 point R:  L:      MMT:      Observations:  (including splints, bandages, incisions, scars):              MODALITIES: 4/5/22 4/11/22    Fluidotherapy (02489) 10' 10'    Estim (09617/39715)      Paraffin (24922)      US (49423)   50% @ 1.5  8'    Iontophoresis (77780)      Hot Pack      Cold Pack            INTERVENTIONS:      Pt educ HEP A/PROM, scar massage       Putty  Rolling, pinching, shaping; mallet                                                                      Manual Therapy (61633)  Scar massage, PROM    (IASTM, Dry Needling, manual mobilization)            Splinting      Lcode:      Orthotic Mgmt, Subsequent Enc (07180)      Orthotic Mgmt & Training (47561)            Other:              Therapeutic Exercise & NMR:  [] (12561) Provided verbal/tactile cueing for activities related to strengthening, flexibility, endurance, ROM  for improvements in scapular, scapulothoracic and UE control with self care, reaching, carrying, lifting, house/yardwork, driving/computer work.    [] (23873) Provided verbal/tactile cueing for activities related to improving balance, coordination, kinesthetic sense, posture, motor skill, proprioception  to assist with  scapular, scapulothoracic and UE control with self care, reaching, carrying, lifting, house/yardwork, driving/computer work.     Therapeutic Activities & NMR:    [] (97356 or 77845) Provided verbal/tactile cueing for activities related to improving balance, coordination, kinesthetic sense, posture, motor skill, proprioception and motor activation to allow for proper function of scapular, scapulothoracic and UE control with self care, carrying, lifting, driving/computer work    Home Exercise Program:    [] (83344) Reviewed/Progressed HEP activities related to strengthening, flexibility, endurance, ROM of scapular, scapulothoracic and UE control with self care, reaching, carrying, lifting, house/yardwork, driving/computer work  [] (24910) Reviewed/Progressed HEP activities related to improving balance, coordination, kinesthetic sense, posture, motor skill, proprioception of scapular, scapulothoracic and UE control with self care, reaching, carrying, lifting, house/yardwork, driving/computer work      Manual Treatments:  PROM / STM / Oscillations-Mobs:  G-I, II, III, IV (PA's, Inf., Post.)  [] (90798) Provided manual therapy to mobilize soft tissue/joints of cervical/CT, scapular GHJ and UE for the purpose of modulating pain, promoting relaxation,  increasing ROM, reducing/eliminating soft tissue swelling/inflammation/restriction, improving soft tissue extensibility and allowing for proper ROM for normal function with self care, reaching, carrying, lifting, house/yardwork, driving/computer work    ADL Training:  [] (81518) Provided self-care/home management training related to activities of daily living and compensatory training, and/or use of adaptive equipment      Charges:  Timed Code Treatment Minutes: 44   Total Treatment Minutes: 54   Worker's Comp: Time In/Time Out     [] EVAL (LOW) 34866 (typically 20 minutes face-to-face)    [] EVAL (MOD) 08826 (typically 30 minutes face-to-face)  [] EVAL (HIGH) 51937 (typically 45 minutes face-to-face)  [] OT Re-eval (31042)       [x] Russell (04163) x  1   [] TNRGF(81910)  [] NMR (65110) x      [] Estim (attended) (26809)   [x] Manual ([id number]) x 1     [x] US (49423)  [] TA (03540) x      [] Paraffin (27036)  [] ADL  (58898) x     [] Splint/L code:    [] Estim (unattended) (23998)  [x] Fluidotherapy (60121)  [] Other:    Comorbidities Affecting Functional Performance:     [x]Anxiety (F41.9)/Depression (F32.9)   []Diabetes Type 1(E10.65) or 2 (E11.65)   []Rheumatoid Arthritis (M05.9)  []Fibromyalgia (M79.7)  []Neuropathy(G60.9)  []Osteoarthritis(M19.91)  []None   []Other:    ASSESSMENT:      GOALS:  Patient stated goal: to have full use of hand    []? Progressing: []? Met: []? Not Met: []? Adjusted     Therapist goals for Patient:   Short Term Goals: To be achieved in: 2 weeks  1. Independent in HEP and progression per patient tolerance, in order to prevent re-injury. []? Progressing: []? Met: []? Not Met: []? Adjusted   2. Patient will have a decrease in pain to facilitate improvement in movement, function, and ADLs as indicated by Functional Deficits. []? Progressing: []? Met: []? Not Met: []? Adjusted     Long Term Goals to be achieved in 6 weeks (through 5/17/22), including patient directed goals to address patient identified performance deficits:  1) Pt to be independent in graded HEP progression with a good level of effort and compliance. []? Progressing: []? Met: []? Not Met: []? Adjusted   2) Pt to report a score of </= 25 % on the Quick DASH disability questionnaire for increased performance with carrying, moving, and handling objects. []? Progressing: []? Met: []? Not Met: []?  Adjusted   3) Pt will demonstrate increased  strength by 5# for improved independence with home mgt and vocational tasks. []? Progressing: []? Met: []? Not Met: []? Adjusted   5) Pt will have a decrease in pain to 2/10 to facilitate home mgt and vocational tasks. []? Progressing: []? Met: []? Not Met: []? Adjusted          Overall Progression Towards Functional Goals/Treatment Progress Update:  [] Patient is progressing as expected towards functional goals listed. [] Progression is slowed due to complexities/impairments listed. [] Progression has been slowed due to co-morbidities. [x] Plan just implemented, too soon to assess goals progression <30 days  [] Goals require adjustment due to lack of progress  [] Patient is not progressing as expected and requires additional follow up with physician  [] All goals are met  [] Other:     Prognosis for POC: [x] Good [] Fair  [] Poor    Patient requires continued skilled intervention: [x] Yes  [] No    Treatment/Activity Tolerance:  [x] Patient able to complete treatment  [] Patient limited by fatigue  [] Patient limited by pain    [] Patient limited by other medical complications  [] Other:                  PLAN: See eval  [] Continue per plan of care [] Alter current plan (see comments above)  [x] Plan of care initiated [] Hold pending MD visit [] Discharge      Electronically signed by:  Debbie DARLING/L [id number]    Note: If patient does not return for scheduled/ recommended follow up visits, this note will serve as a discharge from care along with most recent update on progress.   Phone: [phone]  Fax [phone]

## 2022-04-13 ENCOUNTER — HOSPITAL ENCOUNTER (OUTPATIENT)
Dept: OCCUPATIONAL THERAPY | Age: 32
Setting detail: THERAPIES SERIES
Discharge: HOME | End: 2022-04-13
Payer: COMMERCIAL

## 2022-04-13 PROCEDURE — 97022 WHIRLPOOL THERAPY: CPT | Performed by: OCCUPATIONAL THERAPIST

## 2022-04-13 PROCEDURE — 97110 THERAPEUTIC EXERCISES: CPT | Performed by: OCCUPATIONAL THERAPIST

## 2022-04-13 PROCEDURE — 97140 MANUAL THERAPY 1/> REGIONS: CPT | Performed by: OCCUPATIONAL THERAPIST

## 2022-04-13 PROCEDURE — 97035 APP MDLTY 1+ULTRASOUND EA 15: CPT | Performed by: OCCUPATIONAL THERAPIST

## 2022-04-13 NOTE — FLOWSHEET NOTE
[address] [place], [address] [place] [address] [address]  Phone: [phone] Fax: [phone]     Occupational Therapy Treatment Note/ Progress Report:     Is this a Progress Report:     []  Yes  [x]  No      If Yes:  Date Range for reporting period:  Beginning 22    Ending    Progress report will be due (10 Rx or 30 days whichever is less): 87    Recertification will be due (POC Duration  / 90 days whichever is less): 22   Patient: Drew Chen                          : 1990                                    MRN: [id number]  Referring Physician:  Jocelyn Brumfield                                        Evaluation Date: 2022                                           Medical Diagnosis Information:               E17.590N (ICD-10-CM) - Laceration of finger of right hand without damage to nail, foreign body presence unspecified    Date of Injury: 22  Date of Surgery: N/A                                  Insurance information:  [id number] [facility]     Date of Patient follow up with Physician: 22Has the plan of care been signed (Y/N):        []  Yes  [x]  No       Visit # Insurance Allowable Auth Required   3 ? []  Yes []  No    From 22  to 2022      Preferred Language for Healthcare:   [x]English       []other:     RESTRICTIONS/PRECAUTIONS: Pt not to lift more than 5 lbs     Latex Allergy:  [x]? No      []? Yes                    Pacemaker:  [x]? No       []?  Yes         Functional Scale: 36% (Quick DASH)                                 Date assessed:  2022        C-SSRS Triggered by Intake questionnaire (Past 2 wk assessment):    No, Questionnaire did not trigger screening.    Yes, Patient intake triggered further evaluation       C-SSRS Screening completed   PCP notified via Plan of Care               Emergency services notified     SUBJECTIVE: Pt reports that his finger is getting looser  Patient reported deficits/history of current problem: Pt injured when his finger got trapped when lifting 700lb door with magnets that came loose.     Pain Scale: 4/10          [x]? Constant                []?Intermittent              []?other:  Pain Location:  Long finger  Easing factors: rest  Provocative factors: use      OBJECTIVE:   Date:  Hand Dominance:     [x]? Right    []? Left 4/5/2022      Objective Measures:     PAIN 4/10   Quick DASH 36 %   Digit  ROM         Index       WFL:                              Long WFL                              Ring WFL                              Small WFL   Digits tip to DPFC in cm WFL   Thumb ROM MP  IP   Thumb opposition  R:  L:   Thumb Radial/Palmar abd ROM R:  L:   Wrist ROM Ext/Flex WFL   Rad/Uln dev ROM R:  L:   Edema in cm circumf. MCPJs R:  L:   Edema in cm circumf.   Wrist R:  L:    strength in lbs R:99#  L:126#   Pinch Strengthin lbs: lat  R:  L:   Pinch Strength in lbs:  3 point R:  L:      MMT:      Observations:  (including splints, bandages, incisions, scars):              MODALITIES: 4/5/22 4/11/22 4/13/22   Fluidotherapy (79776) 10' 10' 10'   Estim (67402/98663)      Paraffin (39570)      US (88591)   50% @ 1.5  8' 8'   Iontophoresis (09363)      Hot Pack      Cold Pack            INTERVENTIONS:      Pt educ HEP A/PROM, scar massage       Putty  Rolling, pinching, shaping; mallet       Red digiflex x 25            Power  #2 with sponge             Red flex bar                                       Manual Therapy (49109)  Scar massage, PROM Scar massage, PROM   (IASTM, Dry Needling, manual mobilization)            Splinting      Lcode:      Orthotic Mgmt, Subsequent Enc (97489)      Orthotic Mgmt & Training (08432)            Other:              Therapeutic Exercise & NMR:  [] (96489) Provided verbal/tactile cueing for activities related to strengthening, flexibility, endurance, ROM  for improvements in scapular, scapulothoracic and UE control with self care, reaching, carrying, lifting, house/yardwork, driving/computer work.    [] (38364) Provided verbal/tactile cueing for activities related to improving balance, coordination, kinesthetic sense, posture, motor skill, proprioception  to assist with  scapular, scapulothoracic and UE control with self care, reaching, carrying, lifting, house/yardwork, driving/computer work.     Therapeutic Activities & NMR:    [] (74914 or 55638) Provided verbal/tactile cueing for activities related to improving balance, coordination, kinesthetic sense, posture, motor skill, proprioception and motor activation to allow for proper function of scapular, scapulothoracic and UE control with self care, carrying, lifting, driving/computer work    Home Exercise Program:    [] (25994) Reviewed/Progressed HEP activities related to strengthening, flexibility, endurance, ROM of scapular, scapulothoracic and UE control with self care, reaching, carrying, lifting, house/yardwork, driving/computer work  [] (30777) Reviewed/Progressed HEP activities related to improving balance, coordination, kinesthetic sense, posture, motor skill, proprioception of scapular, scapulothoracic and UE control with self care, reaching, carrying, lifting, house/yardwork, driving/computer work      Manual Treatments:  PROM / STM / Oscillations-Mobs:  G-I, II, III, IV (PA's, Inf., Post.)  [] (07101) Provided manual therapy to mobilize soft tissue/joints of cervical/CT, scapular GHJ and UE for the purpose of modulating pain, promoting relaxation,  increasing ROM, reducing/eliminating soft tissue swelling/inflammation/restriction, improving soft tissue extensibility and allowing for proper ROM for normal function with self care, reaching, carrying, lifting, house/yardwork, driving/computer work    ADL Training:  [] (37961) Provided self-care/home management training related to activities of daily living and compensatory training, and/or use of adaptive equipment Charges:  Timed Code Treatment Minutes: 40   Total Treatment Minutes: 48'   Worker's Comp: Time In/Time Out     [] EVAL (LOW) 70781 (typically 20 minutes face-to-face)    [] EVAL (MOD) 46201 (typically 30 minutes face-to-face)  [] EVAL (HIGH) 79061 (typically 45 minutes face-to-face)  [] OT Re-eval (68558)       [x] Russell (C190572) x  1   [] ODMJZ(30864)  [] NMR (01450) x      [] Estim (attended) (49028)   [x] Manual ([id number]) x 1     [x] US (21426)  [] TA (90106) x      [] Paraffin (89701)  [] ADL  (94978) x     [] Splint/L code:    [] Estim (unattended) [phone])  [x] Fluidotherapy (32715)  [] Other:    Comorbidities Affecting Functional Performance:     [x]Anxiety (F41.9)/Depression (F32.9)   []Diabetes Type 1(E10.65) or 2 (E11.65)   []Rheumatoid Arthritis (M05.9)  []Fibromyalgia (M79.7)  []Neuropathy(G60.9)  []Osteoarthritis(M19.91)  []None   []Other:    ASSESSMENT:      GOALS:  Patient stated goal: to have full use of hand    []? Progressing: []? Met: []? Not Met: []? Adjusted     Therapist goals for Patient:   Short Term Goals: To be achieved in: 2 weeks  1. Independent in HEP and progression per patient tolerance, in order to prevent re-injury. []? Progressing: []? Met: []? Not Met: []? Adjusted   2. Patient will have a decrease in pain to facilitate improvement in movement, function, and ADLs as indicated by Functional Deficits. []? Progressing: []? Met: []? Not Met: []? Adjusted     Long Term Goals to be achieved in 6 weeks (through 5/17/22), including patient directed goals to address patient identified performance deficits:  1) Pt to be independent in graded HEP progression with a good level of effort and compliance. []? Progressing: []? Met: []? Not Met: []? Adjusted   2) Pt to report a score of </= 25 % on the Quick DASH disability questionnaire for increased performance with carrying, moving, and handling objects. []? Progressing: []? Met: []? Not Met: []?  Adjusted   3) Pt will demonstrate increased  strength by 5# for improved independence with home mgt and vocational tasks. []? Progressing: []? Met: []? Not Met: []? Adjusted   5) Pt will have a decrease in pain to 2/10 to facilitate home mgt and vocational tasks. []? Progressing: []? Met: []? Not Met: []? Adjusted          Overall Progression Towards Functional Goals/Treatment Progress Update:  [] Patient is progressing as expected towards functional goals listed. [] Progression is slowed due to complexities/impairments listed. [] Progression has been slowed due to co-morbidities. [x] Plan just implemented, too soon to assess goals progression <30 days  [] Goals require adjustment due to lack of progress  [] Patient is not progressing as expected and requires additional follow up with physician  [] All goals are met  [] Other:     Prognosis for POC: [x] Good [] Fair  [] Poor    Patient requires continued skilled intervention: [x] Yes  [] No    Treatment/Activity Tolerance:  [x] Patient able to complete treatment  [] Patient limited by fatigue  [] Patient limited by pain    [] Patient limited by other medical complications  [] Other:                  PLAN: See eval  [] Continue per plan of care [] Alter current plan (see comments above)  [x] Plan of care initiated [] Hold pending MD visit [] Discharge      Electronically signed by:  Laura DARLING/L [id number]    Note: If patient does not return for scheduled/ recommended follow up visits, this note will serve as a discharge from care along with most recent update on progress.   Phone: [phone]  Fax [phone]

## 2022-04-18 ENCOUNTER — HOSPITAL ENCOUNTER (OUTPATIENT)
Dept: OCCUPATIONAL THERAPY | Age: 32
Setting detail: THERAPIES SERIES
Discharge: HOME | End: 2022-04-18
Payer: COMMERCIAL

## 2022-04-18 PROCEDURE — 97035 APP MDLTY 1+ULTRASOUND EA 15: CPT | Performed by: OCCUPATIONAL THERAPIST

## 2022-04-18 PROCEDURE — 97140 MANUAL THERAPY 1/> REGIONS: CPT | Performed by: OCCUPATIONAL THERAPIST

## 2022-04-18 PROCEDURE — 97022 WHIRLPOOL THERAPY: CPT | Performed by: OCCUPATIONAL THERAPIST

## 2022-04-18 PROCEDURE — 97110 THERAPEUTIC EXERCISES: CPT | Performed by: OCCUPATIONAL THERAPIST

## 2022-04-18 NOTE — FLOWSHEET NOTE
[facility] [place], [address] [facility] [address]  Phone: [phone] Fax: [phone]     Occupational Therapy Treatment Note/ Progress Report:     Is this a Progress Report:     []  Yes  [x]  No      If Yes:  Date Range for reporting period:  Beginning 22    Ending    Progress report will be due (10 Rx or 30 days whichever is less): 53    Recertification will be due (POC Duration  / 90 days whichever is less): 22   Patient: Eugene Emmanuel                          : 1990                                    MRN: [id number]  Referring Physician:  Cherie Tripp                                        Evaluation Date: 2022                                           Medical Diagnosis Information:               Z07.397A (ICD-10-CM) - Laceration of finger of right hand without damage to nail, foreign body presence unspecified    Date of Injury: 22  Date of Surgery: N/A                                  Insurance information:  [facility]     Date of Patient follow up with Physician: 22Has the plan of care been signed (Y/N):        []  Yes  [x]  No       Visit # Insurance Allowable Auth Required   4 ? []  Yes []  No    From 22  to 2022      Preferred Language for Healthcare:   [x]English       []other:     RESTRICTIONS/PRECAUTIONS: Pt not to lift more than 5 lbs     Latex Allergy:  [x]? No      []? Yes                    Pacemaker:  [x]? No       []?  Yes         Functional Scale: 36% (Quick DASH)                                 Date assessed:  2022        C-SSRS Triggered by Intake questionnaire (Past 2 wk assessment):    No, Questionnaire did not trigger screening.    Yes, Patient intake triggered further evaluation       C-SSRS Screening completed   PCP notified via Plan of Care               Emergency services notified     SUBJECTIVE: Pt reports that he can do most things however is limited with grasping object. Patient reported deficits/history of current problem: Pt injured when his finger got trapped when lifting 700lb door with magnets that came loose.     Pain Scale: 4/10          [x]? Constant                []?Intermittent              []?other:  Pain Location:  Long finger  Easing factors: rest  Provocative factors: use      OBJECTIVE:   Date:  Hand Dominance:     [x]? Right    []? Left 4/5/2022      Objective Measures:     PAIN 4/10   Quick DASH 36 %   Digit  ROM         Index       WFL:                              Long WFL                              Ring WFL                              Small WFL   Digits tip to DPFC in cm WFL   Thumb ROM MP  IP   Thumb opposition  R:  L:   Thumb Radial/Palmar abd ROM R:  L:   Wrist ROM Ext/Flex WFL   Rad/Uln dev ROM R:  L:   Edema in cm circumf. MCPJs R:  L:   Edema in cm circumf.   Wrist R:  L:    strength in lbs R:99#  L:126#   Pinch Strengthin lbs: lat  R:  L:   Pinch Strength in lbs:  3 point R:  L:      MMT:      Observations:  (including splints, bandages, incisions, scars):              MODALITIES: 4/5/22 4/11/22 4/13/22 4/18/22   Fluidotherapy (79491) 10' 10' 10' 10'   Estim (50086/35659)       Paraffin (67219)       US (62066)   50% @ 1.5  8' 8' 8'   Iontophoresis (86700)       Hot Pack       Cold Pack              INTERVENTIONS:       Pt educ HEP A/PROM, scar massage        Putty  Rolling, pinching, shaping; mallet  Putty  Rolling, pinching, shaping; mallet      Red digiflex x 25 green digiflex x 25             Power  #2 with sponge  Power  #2 with sponge              Red flex bar Red flex bar  Wrist and forearm 25 x ea direction                                             Manual Therapy (23850)  Scar massage, PROM Scar massage, PROM Scar massage, PROM   (IASTM, Dry Needling, manual mobilization)              Splinting       Lcode:       Orthotic Mgmt, Subsequent Enc (39076)       Orthotic Mgmt & Training (32478) Other:                Therapeutic Exercise & NMR:  [] (13425) Provided verbal/tactile cueing for activities related to strengthening, flexibility, endurance, ROM  for improvements in scapular, scapulothoracic and UE control with self care, reaching, carrying, lifting, house/yardwork, driving/computer work.    [] (48059) Provided verbal/tactile cueing for activities related to improving balance, coordination, kinesthetic sense, posture, motor skill, proprioception  to assist with  scapular, scapulothoracic and UE control with self care, reaching, carrying, lifting, house/yardwork, driving/computer work.     Therapeutic Activities & NMR:    [] (41996 or 91756) Provided verbal/tactile cueing for activities related to improving balance, coordination, kinesthetic sense, posture, motor skill, proprioception and motor activation to allow for proper function of scapular, scapulothoracic and UE control with self care, carrying, lifting, driving/computer work    Home Exercise Program:    [] (69479) Reviewed/Progressed HEP activities related to strengthening, flexibility, endurance, ROM of scapular, scapulothoracic and UE control with self care, reaching, carrying, lifting, house/yardwork, driving/computer work  [] (14281) Reviewed/Progressed HEP activities related to improving balance, coordination, kinesthetic sense, posture, motor skill, proprioception of scapular, scapulothoracic and UE control with self care, reaching, carrying, lifting, house/yardwork, driving/computer work      Manual Treatments:  PROM / STM / Oscillations-Mobs:  G-I, II, III, IV (PA's, Inf., Post.)  [] (99125) Provided manual therapy to mobilize soft tissue/joints of cervical/CT, scapular GHJ and UE for the purpose of modulating pain, promoting relaxation,  increasing ROM, reducing/eliminating soft tissue swelling/inflammation/restriction, improving soft tissue extensibility and allowing for proper ROM for normal function with self care, reaching, carrying, lifting, house/yardwork, driving/computer work    ADL Training:  [] (01378) Provided self-care/home management training related to activities of daily living and compensatory training, and/or use of adaptive equipment      Charges:  Timed Code Treatment Minutes: 40   Total Treatment Minutes: 50'   Worker's Comp: Time In/Time Out     [] EVAL (LOW) 80730 (typically 20 minutes face-to-face)    [] EVAL (MOD) 01169 (typically 30 minutes face-to-face)  [] EVAL (HIGH) [phone] (typically 45 minutes face-to-face)  [] OT Re-eval (82624)       [x] Russell (L158785) x  1   [] OHKBF(38740)  [] NMR (04850) x      [] Estim (attended) (10099)   [x] Manual ([id number]) x 1     [x] US (29178)  [] TA (45620) x      [] Paraffin (96041)  [] ADL  (72569) x     [] Splint/L code:    [] Estim (unattended) ([id number])  [x] Fluidotherapy (83524)  [] Other:    Comorbidities Affecting Functional Performance:     [x]Anxiety (F41.9)/Depression (F32.9)   []Diabetes Type 1(E10.65) or 2 (E11.65)   []Rheumatoid Arthritis (M05.9)  []Fibromyalgia (M79.7)  []Neuropathy(G60.9)  []Osteoarthritis(M19.91)  []None   []Other:    ASSESSMENT:      GOALS:  Patient stated goal: to have full use of hand    []? Progressing: []? Met: []? Not Met: []? Adjusted     Therapist goals for Patient:   Short Term Goals: To be achieved in: 2 weeks  1. Independent in HEP and progression per patient tolerance, in order to prevent re-injury. []? Progressing: []? Met: []? Not Met: []? Adjusted   2. Patient will have a decrease in pain to facilitate improvement in movement, function, and ADLs as indicated by Functional Deficits. []? Progressing: []? Met: []? Not Met: []? Adjusted     Long Term Goals to be achieved in 6 weeks (through 5/17/22), including patient directed goals to address patient identified performance deficits:  1) Pt to be independent in graded HEP progression with a good level of effort and compliance. []? Progressing: []? Met: []? Not Met: []?  Adjusted   2) Pt to report a score of </= 25 % on the Quick DASH disability questionnaire for increased performance with carrying, moving, and handling objects. []? Progressing: []? Met: []? Not Met: []? Adjusted   3) Pt will demonstrate increased  strength by 5# for improved independence with home mgt and vocational tasks. []? Progressing: []? Met: []? Not Met: []? Adjusted   5) Pt will have a decrease in pain to 2/10 to facilitate home mgt and vocational tasks. []? Progressing: []? Met: []? Not Met: []? Adjusted          Overall Progression Towards Functional Goals/Treatment Progress Update:  [] Patient is progressing as expected towards functional goals listed. [] Progression is slowed due to complexities/impairments listed. [] Progression has been slowed due to co-morbidities. [x] Plan just implemented, too soon to assess goals progression <30 days  [] Goals require adjustment due to lack of progress  [] Patient is not progressing as expected and requires additional follow up with physician  [] All goals are met  [] Other:     Prognosis for POC: [x] Good [] Fair  [] Poor    Patient requires continued skilled intervention: [x] Yes  [] No    Treatment/Activity Tolerance:  [x] Patient able to complete treatment  [] Patient limited by fatigue  [] Patient limited by pain    [] Patient limited by other medical complications  [] Other:                  PLAN: See eval  [] Continue per plan of care [] Alter current plan (see comments above)  [x] Plan of care initiated [] Hold pending MD visit [] Discharge      Electronically signed by:  Anabella Pepper OTR/L [id number]    Note: If patient does not return for scheduled/ recommended follow up visits, this note will serve as a discharge from care along with most recent update on progress.   Phone: [phone]  Fax [phone]

## 2022-04-19 ENCOUNTER — OFFICE VISIT (OUTPATIENT)
Dept: ORTHOPEDIC SURGERY | Age: 32
End: 2022-04-19
Payer: COMMERCIAL

## 2022-04-19 DIAGNOSIS — F17.200 CURRENT SMOKER: ICD-10-CM

## 2022-04-19 DIAGNOSIS — S92.342D CLOSED DISPLACED FRACTURE OF FOURTH METATARSAL BONE OF LEFT FOOT WITH ROUTINE HEALING, SUBSEQUENT ENCOUNTER: Primary | ICD-10-CM

## 2022-04-19 DIAGNOSIS — S92.332A DISPLACED FRACTURE OF THIRD METATARSAL BONE, LEFT FOOT, INITIAL ENCOUNTER FOR CLOSED FRACTURE: ICD-10-CM

## 2022-04-19 DIAGNOSIS — S70.12XA HEMATOMA OF LEFT THIGH, INITIAL ENCOUNTER: ICD-10-CM

## 2022-04-19 DIAGNOSIS — S61.219D: ICD-10-CM

## 2022-04-19 PROCEDURE — 99406 BEHAV CHNG SMOKING 3-10 MIN: CPT | Performed by: ORTHOPAEDIC SURGERY

## 2022-04-19 PROCEDURE — 99213 OFFICE O/P EST LOW 20 MIN: CPT | Performed by: ORTHOPAEDIC SURGERY

## 2022-04-19 NOTE — LETTER
[address] [place] [address] [address] Batres [facility] [address]  Phone: [phone]  Fax: [phone]    Usha Wiggins MD        April 19, 2022     Patient: Pito Hunt   YOB: 1990   Date of Visit: 4/19/2022       To Whom It May Concern: It is my medical opinion that Magdalen Dose may return to full duty immediately with no restrictions. If you have any questions or concerns, please don't hesitate to call.     Sincerely,          Usha Wiggins MD

## 2022-04-20 ENCOUNTER — HOSPITAL ENCOUNTER (OUTPATIENT)
Dept: OCCUPATIONAL THERAPY | Age: 32
Setting detail: THERAPIES SERIES
Discharge: HOME | End: 2022-04-20
Payer: COMMERCIAL

## 2022-04-20 NOTE — FLOWSHEET NOTE
Occupational Therapy  Cancellation/No-show Note  Patient Name:  Cookie Rodriguez   :  1990   Date:  2022  Cancelled visits to date: 1  No-shows to date: 0    For today's appointment patient:  [x]    Cancelled  []    Rescheduled appointment  []    No-show     Reason given by patient:  []    Patient ill  []    Conflicting appointment  []    No transportation    []    Conflict with work  [x]    No reason given  []    Other:     Comments:      Electronically signed by:  PHONG Brown/THIAGO [id number]

## 2022-04-23 NOTE — PROGRESS NOTES
CHIEF COMPLAINT:   1- Left foot pain/ 3rd and 4th MT neck minimally displaced fracture. 2- Crush injury of left foot. 3- Left thigh hematoma. 4- Right long finger volar laceration. DATE OF INJURY:  2/24/2022, Worker's Comp. HISTORY:  Mr. Jennifer Figueroa 28 y.o.   male  presents today for f/u evaluation of a left foot and thigh injury and also right long finger laceration which occurred when he was at at work and heavy magnet about 400-500 lb fell on him. He is complaining of minimal left forefoot and thigh pain and swelling and also right long finger laceration. Rates pain a 0/10 VAS. This is better with elevation and worse with bearing wt. The pain is achy and not radiating. No other complaint. He was seen 1st at [facility], where he was x-rayed and splinted and asked to f/u with orthopaedics. Past Medical History:   Diagnosis Date    Hypertension        Past Surgical History:   Procedure Laterality Date    NOSE SURGERY         Social History     Socioeconomic History    Marital status: Single     Spouse name: Not on file    Number of children: Not on file    Years of education: Not on file    Highest education level: Not on file   Occupational History    Not on file   Tobacco Use    Smoking status: Current Every Day Smoker     Packs/day: 1.00     Types: Cigarettes    Smokeless tobacco: Never Used   Vaping Use    Vaping Use: Never used   Substance and Sexual Activity    Alcohol use: Yes     Comment: rarely    Drug use: No    Sexual activity: Yes     Partners: Female   Other Topics Concern    Not on file   Social History Narrative    Not on file     Social Determinants of Health     Financial Resource Strain:     Difficulty of Paying Living Expenses: Not on file   Food Insecurity:     Worried About Running Out of Food in the Last Year: Not on file    Alex of Food in the Last Year: Not on file   Transportation Needs:     Lack of Transportation (Medical):  Not on file    Lack of Transportation (Non-Medical): Not on file   Physical Activity:     Days of Exercise per Week: Not on file    Minutes of Exercise per Session: Not on file   Stress:     Feeling of Stress : Not on file   Social Connections:     Frequency of Communication with Friends and Family: Not on file    Frequency of Social Gatherings with Friends and Family: Not on file    Attends Sikhism Services: Not on file    Active Member of [address] [place] [address] or Organizations: Not on file    Attends Club or Organization Meetings: Not on file    Marital Status: Not on file   Intimate Partner Violence:     Fear of Current or Ex-Partner: Not on file    Emotionally Abused: Not on file    Physically Abused: Not on file    Sexually Abused: Not on file   Housing Stability:     Unable to Pay for Housing in the Last Year: Not on file    Number of [place] in the Last Year: Not on file    Unstable Housing in the Last Year: Not on file       No family history on file. Current Outpatient Medications on File Prior to Visit   Medication Sig Dispense Refill    amphetamine-dextroamphetamine (ADDERALL XR) 20 MG extended release capsule       TRINTELLIX 10 MG TABS tablet        No current facility-administered medications on file prior to visit. Pertinent items are noted in HPI  Review of systems reviewed from Patient History Form and available in the patient's chart under the Media tab. PHYSICAL EXAMINATION:  Mr. Akin Farmer is a very pleasant 28 y.o.  male who presents today in no acute distress, awake, alert, and oriented. He is well dressed, nourished and  groomed. Patient with normal affect. Height is   , weight is  , There is no height or weight on file to calculate BMI. Resting respiratory rate is 16. Examination of the gait, showed that the patient walks WB left leg. Examination of both ankles showing a decreased range of motion of the left ankle and knee compare to the other side because of foot pain. There is mild swelling that can be seen, as well as ecchymosis over dorsal side of the left foot and anterior thigh. He  has intact sensation and good pedal pulses. He has mild tenderness on deep palpation over the 3rd and 4th MT neck area left foot. Left hand with volar healed laceration long finger, intact sensation and good cap refill. Sutures removed 3/8/2022. IMAGING: Елена Downs were reviewed, taken today in the office, 3 views of the left foot, and showed a minimally displaced 3rd and 4th MT neck fracture. X-rays were taken in the ED 2/24/2022, 3 views of the right hand and 2 views left femur, and showed no fracture. No other abnormality. IMPRESSION:   1- Left foot pain/ 3rd and 4th MT neck minimally displaced fracture. 2- Crush injury of left foot. 3- Left thigh hematoma. 4- Right long finger volar laceration. PLAN: I discussed that the overall alignment of these fractures are still good and healing well, and that we can continue to treat this non-operatively with WB. He will start aggressive ROM and peroneal strengthening exercise. We discussed the risk of infection, nonunion and  malunion. Clindamycin PO Rx was sent 3/1/2022. Ice left thigh PRN, ROM right hand long finger. NSAIDs OTC. We will see him  back in 6 week at which time we will re-evaluated wit new xray left foot. Light duty for 6 weeks. The patient smokes cigarettes, and we discussed with the patient the risks of smoking on general health and also on bone and soft tissue healing (delay and non-union), and promised to cut down or stop smoking. Smoking: Educated the patient regarding the hazards of smoking and that it harms their body in many ways. It increases the chance of developing heart disease, lung disease, cancer, and other health problems including poor bone and wound healing. The importance of smoking cessation for optimal bone and wound healing was stressed.  This was communicated verbally,

## 2022-04-25 ENCOUNTER — HOSPITAL ENCOUNTER (OUTPATIENT)
Dept: OCCUPATIONAL THERAPY | Age: 32
Setting detail: THERAPIES SERIES
Discharge: HOME | End: 2022-04-25
Payer: COMMERCIAL

## 2022-04-25 PROCEDURE — 97018 PARAFFIN BATH THERAPY: CPT | Performed by: OCCUPATIONAL THERAPIST

## 2022-04-25 PROCEDURE — 97110 THERAPEUTIC EXERCISES: CPT | Performed by: OCCUPATIONAL THERAPIST

## 2022-04-25 PROCEDURE — 97035 APP MDLTY 1+ULTRASOUND EA 15: CPT | Performed by: OCCUPATIONAL THERAPIST

## 2022-04-25 PROCEDURE — 97140 MANUAL THERAPY 1/> REGIONS: CPT | Performed by: OCCUPATIONAL THERAPIST

## 2022-04-25 NOTE — FLOWSHEET NOTE
[address] [place], [address] [place] [address] [address]  Phone: [phone] Fax: [phone]     Occupational Therapy Treatment Note/ Progress Report:     Is this a Progress Report:     []  Yes  [x]  No      If Yes:  Date Range for reporting period:  Beginning 22    Ending    Progress report will be due (10 Rx or 30 days whichever is less): 16    Recertification will be due (POC Duration  / 90 days whichever is less): 22   Patient: Drew Chen                          : 1990                                    MRN: [id number]  Referring Physician:  Jocelyn Brumfield                                        Evaluation Date: 2022                                           Medical Diagnosis Information:               F60.146H (ICD-10-CM) - Laceration of finger of right hand without damage to nail, foreign body presence unspecified    Date of Injury: 22  Date of Surgery: N/A                                  Insurance information:  [facility]     Date of Patient follow up with Physician: 22Has the plan of care been signed (Y/N):        []  Yes  [x]  No       Visit # Insurance Allowable Auth Required   5 ? []  Yes []  No    From 22  to 2022      Preferred Language for Healthcare:   [x]English       []other:     RESTRICTIONS/PRECAUTIONS: Pt not to lift more than 5 lbs     Latex Allergy:  [x]? No      []? Yes                    Pacemaker:  [x]? No       []?  Yes         Functional Scale: 36% (Quick DASH)                                 Date assessed:  2022        C-SSRS Triggered by Intake questionnaire (Past 2 wk assessment):    No, Questionnaire did not trigger screening.    Yes, Patient intake triggered further evaluation       C-SSRS Screening completed   PCP notified via Plan of Care               Emergency services notified     SUBJECTIVE: Pt reports he still has significant stiffness in middle finger  Patient reported deficits/history of current problem: Pt injured when his finger got trapped when lifting 700lb door with magnets that came loose.     Pain Scale: 4/10          [x]? Constant                []?Intermittent              []?other:  Pain Location:  Long finger  Easing factors: rest  Provocative factors: use      OBJECTIVE:   Date:  Hand Dominance:     [x]? Right    []? Left 4/5/2022 4/25/22   Objective Measures:      PAIN 4/10 1/10   Quick DASH 36 %    Digit  ROM         Index       WFL:                               Long WFL                               Ring WFL                               Small WFL    Digits tip to DPFC in cm WFL    Thumb ROM MP  IP    Thumb opposition  R:  L:    Thumb Radial/Palmar abd ROM R:  L:    Wrist ROM Ext/Flex WFL    Rad/Uln dev ROM R:  L:    Edema in cm circumf. MCPJs R:  L:    Edema in cm circumf.   Wrist R:  L:     strength in lbs R:99#  L:126#    Pinch Strengthin lbs: lat  R:  L:    Pinch Strength in lbs:  3 point R:  L:       MMT:       Observations:  (including splints, bandages, incisions, scars):               MODALITIES: 4/5/22 4/11/22 4/13/22 4/18/22 4/25/22   Fluidotherapy (12875) 10' 10' 10' 10'    Estim (90933/90078)        Paraffin (95569)     10'   US (00612)   50% @ 1.5  8' 8' 8' 8'   Iontophoresis (09971)        Hot Pack        Cold Pack                INTERVENTIONS:        Pt educ HEP A/PROM, scar massage         Putty  Rolling, pinching, shaping; mallet  Putty  Rolling, pinching, shaping; mallet Putty  Rolling, pinching, shaping; mallet      Red digiflex x 25 green digiflex x 25 green digiflex x 25              Power  #2 with sponge  Power  #2 with sponge                Red flex bar Red flex bar  Wrist and forearm 25 x ea direction green flex bar  Wrist and forearm 25 x ea direction                                                   Manual Therapy (46422)  Scar massage, PROM Scar massage, PROM Scar massage, PROM Scar massage, PROM (IASTM, Dry Needling, manual mobilization)                Splinting        Lcode:        Orthotic Mgmt, Subsequent Enc (53983)        Orthotic Mgmt & Training (95872)                Other:                  Therapeutic Exercise & NMR:  [] (49289) Provided verbal/tactile cueing for activities related to strengthening, flexibility, endurance, ROM  for improvements in scapular, scapulothoracic and UE control with self care, reaching, carrying, lifting, house/yardwork, driving/computer work.    [] (10605) Provided verbal/tactile cueing for activities related to improving balance, coordination, kinesthetic sense, posture, motor skill, proprioception  to assist with  scapular, scapulothoracic and UE control with self care, reaching, carrying, lifting, house/yardwork, driving/computer work.     Therapeutic Activities & NMR:    [] (46998 or 40502) Provided verbal/tactile cueing for activities related to improving balance, coordination, kinesthetic sense, posture, motor skill, proprioception and motor activation to allow for proper function of scapular, scapulothoracic and UE control with self care, carrying, lifting, driving/computer work    Home Exercise Program:    [] (06983) Reviewed/Progressed HEP activities related to strengthening, flexibility, endurance, ROM of scapular, scapulothoracic and UE control with self care, reaching, carrying, lifting, house/yardwork, driving/computer work  [] (18363) Reviewed/Progressed HEP activities related to improving balance, coordination, kinesthetic sense, posture, motor skill, proprioception of scapular, scapulothoracic and UE control with self care, reaching, carrying, lifting, house/yardwork, driving/computer work      Manual Treatments:  PROM / STM / Oscillations-Mobs:  G-I, II, III, IV (PA's, Inf., Post.)  [] (65707) Provided manual therapy to mobilize soft tissue/joints of cervical/CT, scapular GHJ and UE for the purpose of modulating pain, promoting relaxation,  increasing ROM, reducing/eliminating soft tissue swelling/inflammation/restriction, improving soft tissue extensibility and allowing for proper ROM for normal function with self care, reaching, carrying, lifting, house/yardwork, driving/computer work    ADL Training:  [] (26002) Provided self-care/home management training related to activities of daily living and compensatory training, and/or use of adaptive equipment      Charges:  Timed Code Treatment Minutes: 40   Total Treatment Minutes: 50'   Worker's Comp: Time In/Time Out     [] EVAL (LOW) 12466 (typically 20 minutes face-to-face)    [] EVAL (MOD) 18858 (typically 30 minutes face-to-face)  [] EVAL (HIGH) 78321 (typically 45 minutes face-to-face)  [] OT Re-eval (83638)       [x] Russell ([id number]) x  1   [] WQQCH(08810)  [] NMR (24057) x      [] Estim (attended) (15789)   [x] Manual ([id number]) x 1     [x] US (08978)  [] TA (25213) x      [x] Paraffin (40224)  [] ADL  (38471) x     [] Splint/L code:    [] Estim (unattended) (26178)  [] Fluidotherapy (34742)  [] Other:    Comorbidities Affecting Functional Performance:     [x]Anxiety (F41.9)/Depression (F32.9)   []Diabetes Type 1(E10.65) or 2 (E11.65)   []Rheumatoid Arthritis (M05.9)  []Fibromyalgia (M79.7)  []Neuropathy(G60.9)  []Osteoarthritis(M19.91)  []None   []Other:    ASSESSMENT:      GOALS:  Patient stated goal: to have full use of hand    []? Progressing: []? Met: []? Not Met: []? Adjusted     Therapist goals for Patient:   Short Term Goals: To be achieved in: 2 weeks  1. Independent in HEP and progression per patient tolerance, in order to prevent re-injury. []? Progressing: []? Met: []? Not Met: []? Adjusted   2. Patient will have a decrease in pain to facilitate improvement in movement, function, and ADLs as indicated by Functional Deficits. []? Progressing: []? Met: []? Not Met: []?  Adjusted     Long Term Goals to be achieved in 6 weeks (through 5/17/22), including patient directed goals to address patient identified performance deficits:  1) Pt to be independent in graded HEP progression with a good level of effort and compliance. []? Progressing: []? Met: []? Not Met: []? Adjusted   2) Pt to report a score of </= 25 % on the Quick DASH disability questionnaire for increased performance with carrying, moving, and handling objects. []? Progressing: []? Met: []? Not Met: []? Adjusted   3) Pt will demonstrate increased  strength by 5# for improved independence with home mgt and vocational tasks. []? Progressing: []? Met: []? Not Met: []? Adjusted   5) Pt will have a decrease in pain to 2/10 to facilitate home mgt and vocational tasks. []? Progressing: []? Met: []? Not Met: []? Adjusted          Overall Progression Towards Functional Goals/Treatment Progress Update:  [] Patient is progressing as expected towards functional goals listed. [] Progression is slowed due to complexities/impairments listed. [] Progression has been slowed due to co-morbidities. [x] Plan just implemented, too soon to assess goals progression <30 days  [] Goals require adjustment due to lack of progress  [] Patient is not progressing as expected and requires additional follow up with physician  [] All goals are met  [] Other:     Prognosis for POC: [x] Good [] Fair  [] Poor    Patient requires continued skilled intervention: [x] Yes  [] No    Treatment/Activity Tolerance:  [x] Patient able to complete treatment  [] Patient limited by fatigue  [] Patient limited by pain    [] Patient limited by other medical complications  [] Other:                  PLAN: See eval  [] Continue per plan of care [] Alter current plan (see comments above)  [x] Plan of care initiated [] Hold pending MD visit [] Discharge      Electronically signed by:  Dana Graves OTR/L [id number]    Note: If patient does not return for scheduled/ recommended follow up visits, this note will serve as a discharge from care along with most recent update on progress.   Phone: [phone]  Fax [phone]

## 2022-04-27 ENCOUNTER — HOSPITAL ENCOUNTER (OUTPATIENT)
Dept: OCCUPATIONAL THERAPY | Age: 32
Setting detail: THERAPIES SERIES
Discharge: HOME | End: 2022-04-27
Payer: COMMERCIAL

## 2022-04-27 PROCEDURE — 97018 PARAFFIN BATH THERAPY: CPT | Performed by: OCCUPATIONAL THERAPIST

## 2022-04-27 PROCEDURE — 97035 APP MDLTY 1+ULTRASOUND EA 15: CPT | Performed by: OCCUPATIONAL THERAPIST

## 2022-04-27 PROCEDURE — 97110 THERAPEUTIC EXERCISES: CPT | Performed by: OCCUPATIONAL THERAPIST

## 2022-04-27 PROCEDURE — 97140 MANUAL THERAPY 1/> REGIONS: CPT | Performed by: OCCUPATIONAL THERAPIST

## 2022-04-27 NOTE — FLOWSHEET NOTE
[address] [place], [address] [place] [address] [address]  Phone: [phone] Fax: [phone]     Occupational Therapy Treatment Note/ Progress Report:     Is this a Progress Report:     []  Yes  [x]  No      If Yes:  Date Range for reporting period:  Beginning 22    Ending    Progress report will be due (10 Rx or 30 days whichever is less): 59    Recertification will be due (POC Duration  / 90 days whichever is less): 22   Patient: Alexa Albert                          : 1990                                    MRN: [id number]  Referring Physician:  Jeffrey Almonteinter                                        Evaluation Date: 2022                                           Medical Diagnosis Information:               J48.500V (ICD-10-CM) - Laceration of finger of right hand without damage to nail, foreign body presence unspecified    Date of Injury: 22  Date of Surgery: N/A                                  Insurance information:  [id number]     Date of Patient follow up with Physician: 22Has the plan of care been signed (Y/N):        []  Yes  [x]  No       Visit # Insurance Allowable Auth Required    6 ? []  Yes []  No    From 22  to 2022      Preferred Language for Healthcare:   [x]English       []other:     RESTRICTIONS/PRECAUTIONS: Pt not to lift more than 5 lbs     Latex Allergy:  [x]? No      []? Yes                    Pacemaker:  [x]? No       []?  Yes         Functional Scale: 36% (Quick DASH)                                 Date assessed:  2022        C-SSRS Triggered by Intake questionnaire (Past 2 wk assessment):    No, Questionnaire did not trigger screening.    Yes, Patient intake triggered further evaluation       C-SSRS Screening completed   PCP notified via Plan of Care               Emergency services notified     SUBJECTIVE: Pt reports he still has significant stiffness in middle finger  Patient reported deficits/history of current problem: Pt injured when his finger got trapped when lifting 700lb door with magnets that came loose.     Pain Scale: 4/10          [x]? Constant                []?Intermittent              []?other:  Pain Location:  Long finger  Easing factors: rest  Provocative factors: use      OBJECTIVE:   Date:  Hand Dominance:     [x]? Right    []? Left 4/5/2022 4/25/22   Objective Measures:      PAIN 4/10 1/10   Quick DASH 36 %    Digit  ROM         Index       WFL:                               Long WFL                               Ring WFL                               Small WFL    Digits tip to DPFC in cm WFL    Thumb ROM MP  IP    Thumb opposition  R:  L:    Thumb Radial/Palmar abd ROM R:  L:    Wrist ROM Ext/Flex WFL    Rad/Uln dev ROM R:  L:    Edema in cm circumf. MCPJs R:  L:    Edema in cm circumf.   Wrist R:  L:     strength in lbs R:99#  L:126#    Pinch Strengthin lbs: lat  R:  L:    Pinch Strength in lbs:  3 point R:  L:       MMT:       Observations:  (including splints, bandages, incisions, scars):               MODALITIES: 4/5/22 4/11/22 4/13/22 4/18/22 4/25/22 4/27/22   Fluidotherapy (63784) 10' 10' 10' 10'  10'   Estim (22530/79578)         Paraffin (35645)     10'    US (05910)   50% @ 1.5  8' 8' 8' 8' 8'   Iontophoresis (50334)         Hot Pack         Cold Pack                  INTERVENTIONS:         Pt educ HEP A/PROM, scar massage          Putty  Rolling, pinching, shaping; mallet  Putty  Rolling, pinching, shaping; mallet Putty  Rolling, pinching, shaping; mallet Pink Putty  Rolling, pinching, shaping; mallet      Red digiflex x 25 green digiflex x 25 green digiflex x 25 green digiflex x 25               Power  #2 with sponge  Power  #2 with sponge                  Red flex bar Red flex bar  Wrist and forearm 25 x ea direction green flex bar  Wrist and forearm 25 x ea direction green flex bar  Wrist and forearm 25 x ea direction Manual Therapy (69538)  Scar massage, PROM Scar massage, PROM Scar massage, PROM Scar massage, PROM Scar massage, PROM   (IASTM, Dry Needling, manual mobilization)                  Splinting         Lcode:         Orthotic Mgmt, Subsequent Enc (18305)         Orthotic Mgmt & Training (24049)                  Other:                    Therapeutic Exercise & NMR:  [] (14699) Provided verbal/tactile cueing for activities related to strengthening, flexibility, endurance, ROM  for improvements in scapular, scapulothoracic and UE control with self care, reaching, carrying, lifting, house/yardwork, driving/computer work.    [] (20740) Provided verbal/tactile cueing for activities related to improving balance, coordination, kinesthetic sense, posture, motor skill, proprioception  to assist with  scapular, scapulothoracic and UE control with self care, reaching, carrying, lifting, house/yardwork, driving/computer work.     Therapeutic Activities & NMR:    [] (84260 or 61995) Provided verbal/tactile cueing for activities related to improving balance, coordination, kinesthetic sense, posture, motor skill, proprioception and motor activation to allow for proper function of scapular, scapulothoracic and UE control with self care, carrying, lifting, driving/computer work    Home Exercise Program:    [] (57435) Reviewed/Progressed HEP activities related to strengthening, flexibility, endurance, ROM of scapular, scapulothoracic and UE control with self care, reaching, carrying, lifting, house/yardwork, driving/computer work  [] (86583) Reviewed/Progressed HEP activities related to improving balance, coordination, kinesthetic sense, posture, motor skill, proprioception of scapular, scapulothoracic and UE control with self care, reaching, carrying, lifting, house/yardwork, driving/computer work      Manual Treatments:  PROM / STM / Oscillations-Mobs:  G-I, II, III, IV (PA's, Inf., Post.)  [] (84826) Provided manual therapy to mobilize soft tissue/joints of cervical/CT, scapular GHJ and UE for the purpose of modulating pain, promoting relaxation,  increasing ROM, reducing/eliminating soft tissue swelling/inflammation/restriction, improving soft tissue extensibility and allowing for proper ROM for normal function with self care, reaching, carrying, lifting, house/yardwork, driving/computer work    ADL Training:  [] (23576) Provided self-care/home management training related to activities of daily living and compensatory training, and/or use of adaptive equipment      Charges:  Timed Code Treatment Minutes: 40   Total Treatment Minutes: 50'   Worker's Comp: Time In/Time Out     [] EVAL (LOW) 43519 (typically 20 minutes face-to-face)    [] EVAL (MOD) 04223 (typically 30 minutes face-to-face)  [] EVAL (HIGH) 56807 (typically 45 minutes face-to-face)  [] OT Re-eval (36479)       [x] Russell ([id number]) x  1   [] QIOYV(75545)  [] NMR (62977) x      [] Estim (attended) (72258)   [x] Manual ([id number]) x 1     [x] US (38259)  [] TA (46667) x      [x] Paraffin (49392)  [] ADL  (68824) x     [] Splint/L code:    [] Estim (unattended) (42963)  [] Fluidotherapy (92845)  [] Other:    Comorbidities Affecting Functional Performance:     [x]Anxiety (F41.9)/Depression (F32.9)   []Diabetes Type 1(E10.65) or 2 (E11.65)   []Rheumatoid Arthritis (M05.9)  []Fibromyalgia (M79.7)  []Neuropathy(G60.9)  []Osteoarthritis(M19.91)  []None   []Other:    ASSESSMENT:  Pt met all set OT goals    GOALS:  Patient stated goal: to have full use of hand    []? Progressing: []? Met: []? Not Met: []? Adjusted     Therapist goals for Patient:   Short Term Goals: To be achieved in: 2 weeks  1. Independent in HEP and progression per patient tolerance, in order to prevent re-injury. []? Progressing: [x]? Met: []? Not Met: []? Adjusted   2.  Patient will have a decrease in pain to facilitate improvement in movement, function, and ADLs as indicated by Functional Deficits. []? Progressing: [x]? Met: []? Not Met: []? Adjusted     Long Term Goals to be achieved in 6 weeks (through 5/17/22), including patient directed goals to address patient identified performance deficits:  1) Pt to be independent in graded HEP progression with a good level of effort and compliance. []? Progressing: [x]? Met: []? Not Met: []? Adjusted   2) Pt to report a score of </= 25 % on the Quick DASH disability questionnaire for increased performance with carrying, moving, and handling objects. []? Progressing: [x]? Met: []? Not Met: []? Adjusted   3) Pt will demonstrate increased  strength by 5# for improved independence with home mgt and vocational tasks. []? Progressing: [x]? Met: []? Not Met: []? Adjusted   5) Pt will have a decrease in pain to 2/10 to facilitate home mgt and vocational tasks. []? Progressing: [x]? Met: []? Not Met: []? Adjusted          Overall Progression Towards Functional Goals/Treatment Progress Update:  [] Patient is progressing as expected towards functional goals listed. [] Progression is slowed due to complexities/impairments listed. [] Progression has been slowed due to co-morbidities.   [x] Plan just implemented, too soon to assess goals progression <30 days  [] Goals require adjustment due to lack of progress  [] Patient is not progressing as expected and requires additional follow up with physician  [] All goals are met  [] Other:     Prognosis for POC: [x] Good [] Fair  [] Poor    Patient requires continued skilled intervention: [x] Yes  [] No    Treatment/Activity Tolerance:  [x] Patient able to complete treatment  [] Patient limited by fatigue  [] Patient limited by pain    [] Patient limited by other medical complications  [] Other:                  PLAN: See eval  [] Continue per plan of care [] Alter current plan (see comments above)  [x] Plan of care initiated [] Hold pending MD visit [] Discharge      Electronically signed by:  Cheyanne Coelho OTR/L [id number]    Note: If patient does not return for scheduled/ recommended follow up visits, this note will serve as a discharge from care along with most recent update on progress.   Phone: [phone]  Fax [phone]

## 2022-06-21 ENCOUNTER — OFFICE VISIT (OUTPATIENT)
Dept: ORTHOPEDIC SURGERY | Age: 32
End: 2022-06-21
Payer: COMMERCIAL

## 2022-06-21 DIAGNOSIS — S70.12XA HEMATOMA OF LEFT THIGH, INITIAL ENCOUNTER: ICD-10-CM

## 2022-06-21 DIAGNOSIS — S61.219D: ICD-10-CM

## 2022-06-21 DIAGNOSIS — F17.200 CURRENT SMOKER: ICD-10-CM

## 2022-06-21 DIAGNOSIS — S92.342D CLOSED DISPLACED FRACTURE OF FOURTH METATARSAL BONE OF LEFT FOOT WITH ROUTINE HEALING, SUBSEQUENT ENCOUNTER: Primary | ICD-10-CM

## 2022-06-21 DIAGNOSIS — S92.332A DISPLACED FRACTURE OF THIRD METATARSAL BONE, LEFT FOOT, INITIAL ENCOUNTER FOR CLOSED FRACTURE: ICD-10-CM

## 2022-06-21 PROCEDURE — 99406 BEHAV CHNG SMOKING 3-10 MIN: CPT | Performed by: ORTHOPAEDIC SURGERY

## 2022-06-21 PROCEDURE — 99214 OFFICE O/P EST MOD 30 MIN: CPT | Performed by: ORTHOPAEDIC SURGERY

## 2022-06-21 NOTE — LETTER
[address] [place] [address] Dr Morris Barkley [address] [place] [address] Drive formerly [facility]  Phone: [phone]  Fax: Yusra Westfall MD        June 21, 2022     Patient: Tiera Marley   YOB: 1990   Date of Visit: 6/21/2022       To Whom It May Concern: It is my medical opinion that Mirta Ding may return to full duty immediately with no restrictions. If you have any questions or concerns, please don't hesitate to call.     Sincerely,        Stephane Hernandez MD

## 2022-06-26 NOTE — PROGRESS NOTES
CHIEF COMPLAINT:   1- Left foot pain/ 3rd and 4th MT neck minimally displaced fracture. 2- Crush injury of left foot. 3- Left thigh hematoma. 4- Right long finger volar laceration. DATE OF INJURY:  2/24/2022, Worker's Comp. HISTORY:  Mr. Marilyn Ceballos 28 y.o.   male  presents today for f/u evaluation of a left foot and thigh injury and also right long finger laceration which occurred when he was at at work and heavy magnet about 400-500 lb fell on him. He is complaining of minimal left forefoot and thigh pain and swelling and also right long finger laceration. Rates pain a 0/10 VAS. This is better with elevation and worse with bearing wt. The pain is achy and not radiating. No other complaint. He was seen 1st at [facility], where he was x-rayed and splinted and asked to f/u with orthopaedics. Past Medical History:   Diagnosis Date    Hypertension        Past Surgical History:   Procedure Laterality Date    NOSE SURGERY         Social History     Socioeconomic History    Marital status: Single     Spouse name: Not on file    Number of children: Not on file    Years of education: Not on file    Highest education level: Not on file   Occupational History    Not on file   Tobacco Use    Smoking status: Current Every Day Smoker     Packs/day: 1.00     Types: Cigarettes    Smokeless tobacco: Never Used   Vaping Use    Vaping Use: Never used   Substance and Sexual Activity    Alcohol use: Yes     Comment: rarely    Drug use: No    Sexual activity: Yes     Partners: Female   Other Topics Concern    Not on file   Social History Narrative    Not on file     Social Determinants of Health     Financial Resource Strain:     Difficulty of Paying Living Expenses: Not on file   Food Insecurity:     Worried About Running Out of Food in the Last Year: Not on file    Alex of Food in the Last Year: Not on file   Transportation Needs:     Lack of Transportation (Medical):  Not on file    Lack of Transportation (Non-Medical): Not on file   Physical Activity:     Days of Exercise per Week: Not on file    Minutes of Exercise per Session: Not on file   Stress:     Feeling of Stress : Not on file   Social Connections:     Frequency of Communication with Friends and Family: Not on file    Frequency of Social Gatherings with Friends and Family: Not on file    Attends Latter day Services: Not on file    Active Member of [address] [place] [address] or Organizations: Not on file    Attends Club or Organization Meetings: Not on file    Marital Status: Not on file   Intimate Partner Violence:     Fear of Current or Ex-Partner: Not on file    Emotionally Abused: Not on file    Physically Abused: Not on file    Sexually Abused: Not on file   Housing Stability:     Unable to Pay for Housing in the Last Year: Not on file    Number of [place] in the Last Year: Not on file    Unstable Housing in the Last Year: Not on file       No family history on file. Current Outpatient Medications on File Prior to Visit   Medication Sig Dispense Refill    amphetamine-dextroamphetamine (ADDERALL XR) 20 MG extended release capsule       TRINTELLIX 10 MG TABS tablet        No current facility-administered medications on file prior to visit. Pertinent items are noted in HPI  Review of systems reviewed from Patient History Form and available in the patient's chart under the Media tab. PHYSICAL EXAMINATION:  Mr. Francine Doyle is a very pleasant 28 y.o.  male who presents today in no acute distress, awake, alert, and oriented. He is well dressed, nourished and  groomed. Patient with normal affect. Height is   , weight is  , There is no height or weight on file to calculate BMI. Resting respiratory rate is 16. Examination of the gait, showed that the patient walks WB left leg. Examination of both ankles showing a decreased range of motion of the left ankle and knee compare to the other side because of foot pain. There is mild swelling that can be seen, as well as ecchymosis over dorsal side of the left foot and anterior thigh. He  has intact sensation and good pedal pulses. He has mild tenderness on deep palpation over the 3rd and 4th MT neck area left foot. Left hand with volar healed laceration long finger, intact sensation and good cap refill. Sutures removed 3/8/2022. IMAGING: Bina Bush were reviewed, taken today in the office, 3 views of the left foot, and showed a minimally displaced 3rd and 4th MT neck fracture. X-rays were taken in the ED 2/24/2022, 3 views of the right hand and 2 views left femur, and showed no fracture. No other abnormality. IMPRESSION:   1- Left foot pain/ 3rd and 4th MT neck minimally displaced fracture. 2- Crush injury of left foot. 3- Left thigh hematoma. 4- Right long finger volar laceration. PLAN: I discussed that the overall alignment of these fractures are still good and healing well, and that we can continue to treat this non-operatively with WB. He will start aggressive ROM and peroneal strengthening exercise. We discussed the risk of infection, nonunion and  malunion. Ice left thigh PRN, ROM right hand long finger. NSAIDs OTC. He can go back to normal activity with no restrictions. He will be seen PRN. I told the patient that it is not unusual to have some achy pain and swelling for up to a year after a fracture. Full duty. The patient smokes cigarettes, and we discussed with the patient the risks of smoking on general health and also on bone and soft tissue healing (delay and non-union), and promised to cut down or stop smoking. Smoking: Educated the patient regarding the hazards of smoking and that it harms their body in many ways. It increases the chance of developing heart disease, lung disease, cancer, and other health problems including poor bone and wound healing.     The importance of smoking cessation for optimal bone and wound healing was stressed. This was communicated verbally, 5 Minutes.       Xuan Covington MD

## 2022-07-12 ENCOUNTER — OFFICE VISIT (OUTPATIENT)
Dept: FAMILY MEDICINE CLINIC | Age: 32
End: 2022-07-12
Payer: COMMERCIAL

## 2022-07-12 VITALS
WEIGHT: 159 LBS | DIASTOLIC BLOOD PRESSURE: 90 MMHG | HEIGHT: 68 IN | BODY MASS INDEX: 24.1 KG/M2 | HEART RATE: 86 BPM | OXYGEN SATURATION: 99 % | SYSTOLIC BLOOD PRESSURE: 150 MMHG

## 2022-07-12 DIAGNOSIS — F90.2 ATTENTION DEFICIT HYPERACTIVITY DISORDER (ADHD), COMBINED TYPE: ICD-10-CM

## 2022-07-12 DIAGNOSIS — I10 PRIMARY HYPERTENSION: ICD-10-CM

## 2022-07-12 DIAGNOSIS — F32.A DEPRESSION, UNSPECIFIED DEPRESSION TYPE: Primary | ICD-10-CM

## 2022-07-12 PROCEDURE — 99204 OFFICE O/P NEW MOD 45 MIN: CPT

## 2022-07-12 RX ORDER — HYDROCHLOROTHIAZIDE 12.5 MG/1
12.5 TABLET ORAL EVERY MORNING
Qty: 30 TABLET | Refills: 0 | Status: SHIPPED | OUTPATIENT
Start: 2022-07-12 | End: 2022-08-09 | Stop reason: SDUPTHER

## 2022-07-12 RX ORDER — DULOXETIN HYDROCHLORIDE 30 MG/1
30 CAPSULE, DELAYED RELEASE ORAL DAILY
COMMUNITY

## 2022-07-12 RX ORDER — DULOXETIN HYDROCHLORIDE 60 MG/1
60 CAPSULE, DELAYED RELEASE ORAL DAILY
COMMUNITY

## 2022-07-12 ASSESSMENT — ENCOUNTER SYMPTOMS
ABDOMINAL DISTENTION: 0
SORE THROAT: 0
SHORTNESS OF BREATH: 0
CONSTIPATION: 0
EYE PAIN: 0
DIARRHEA: 0
CHEST TIGHTNESS: 0
ABDOMINAL PAIN: 0
COUGH: 0
EYE DISCHARGE: 0
COLOR CHANGE: 0

## 2022-07-12 ASSESSMENT — PATIENT HEALTH QUESTIONNAIRE - PHQ9
1. LITTLE INTEREST OR PLEASURE IN DOING THINGS: 1
SUM OF ALL RESPONSES TO PHQ QUESTIONS 1-9: 2
SUM OF ALL RESPONSES TO PHQ9 QUESTIONS 1 & 2: 2
SUM OF ALL RESPONSES TO PHQ QUESTIONS 1-9: 2
2. FEELING DOWN, DEPRESSED OR HOPELESS: 1

## 2022-07-12 NOTE — PROGRESS NOTES
[facility]  Establish care visit   2022    Maryjo Chaudhary (:  1990) is a 28 y.o. male, here to establish care. Chief Complaint   Patient presents with   [address]     no former pcp    Hypertension     bp been running high at work        ASSESSMENT/ PLAN  1. Depression, unspecified depression type  -Stable on Cymbalta  -Continue    2. Attention deficit hyperactivity disorder (ADHD), combined type  -Stable on Adderall  -Continue    3. Primary hypertension  -Start hydrochlorothiazide 12.5 mg  -Previously was on 25 mg  -Check urine next visit for protein  -Follow-up in 4 weeks  - Protein / creatinine ratio, urine  - hydroCHLOROthiazide (HYDRODIURIL) 12.5 MG tablet; Take 1 tablet by mouth every morning  Dispense: 30 tablet; Refill: 0         Preventative Care:  Urine    Imaging:    Return in about 4 weeks (around 2022) for HTN. HPI  Patient is here to establish care. He is here for evaluation of elevated BP. Apparently, he was treated for HTN in the past with HCTZ and he was controlled at that time. He has been off medication for about 4 years. He took himself off medication. He does state that he drinks about 180 milligrams of caffeine daily. He is also on Adderall for ADHD    Previously was on Trintellix for depression. Switch over to Cymbalta. Tolerating Cymbalta well. History of herpes simplex of the genitals. Smokes about a pack a day  Not sexually active at this time. Occasionally uses alcohol      ROS  Review of Systems   Constitutional: Negative for chills, fever and unexpected weight change. HENT: Negative for congestion, dental problem and sore throat. Eyes: Negative for pain and discharge. Respiratory: Negative for cough, chest tightness and shortness of breath. Cardiovascular: Negative for chest pain, palpitations and leg swelling. Gastrointestinal: Negative for abdominal distention, abdominal pain, constipation and diarrhea. Endocrine: Negative for polydipsia, polyphagia and polyuria. Genitourinary: Negative for dysuria, flank pain, hematuria and urgency. Musculoskeletal: Negative for arthralgias, joint swelling and myalgias. Skin: Negative for color change and rash. Neurological: Negative for dizziness, light-headedness, numbness and headaches. Psychiatric/Behavioral: Negative for agitation and behavioral problems. The patient is not nervous/anxious. HISTORIES  Current Outpatient Medications on File Prior to Visit   Medication Sig Dispense Refill    DULoxetine (CYMBALTA) 60 MG extended release capsule Take 60 mg by mouth daily      DULoxetine (CYMBALTA) 30 MG extended release capsule Take 30 mg by mouth daily      amphetamine-dextroamphetamine (ADDERALL XR) 20 MG extended release capsule        No current facility-administered medications on file prior to visit.         Allergies   Allergen Reactions    Amoxicillin     Lisinopril Other (See Comments)     cough    Ampicillin Rash       Past Medical History:   Diagnosis Date    Hypertension        Patient Active Problem List   Diagnosis    Closed displaced fracture of fourth metatarsal bone of left foot    Displaced fracture of third metatarsal bone, left foot, initial encounter for closed fracture    Hematoma of left thigh    Laceration of finger of right hand without damage to nail    Crush injury of foot, left, initial encounter    Current smoker    Depression    Attention deficit hyperactivity disorder (ADHD), combined type    Primary hypertension       Past Surgical History:   Procedure Laterality Date    NOSE SURGERY         Social History     Socioeconomic History    Marital status: Single     Spouse name: Not on file    Number of children: Not on file    Years of education: Not on file    Highest education level: Not on file   Occupational History    Not on file   Tobacco Use    Smoking status: Current Every Day Smoker     Packs/day: 1.00 Years: 10.00     Pack years: 10.00     Types: Cigarettes    Smokeless tobacco: Never Used   Vaping Use    Vaping Use: Never used   Substance and Sexual Activity    Alcohol use: Yes     Comment: rarely    Drug use: No    Sexual activity: Yes     Partners: Female   Other Topics Concern    Not on file   Social History Narrative    Not on file     Social Determinants of Health     Financial Resource Strain:     Difficulty of Paying Living Expenses: Not on file   Food Insecurity:     Worried About Running Out of Food in the Last Year: Not on file    Alex of Food in the Last Year: Not on file   Transportation Needs:     Lack of Transportation (Medical): Not on file    Lack of Transportation (Non-Medical): Not on file   Physical Activity:     Days of Exercise per Week: Not on file    Minutes of Exercise per Session: Not on file   Stress:     Feeling of Stress : Not on file   Social Connections:     Frequency of Communication with Friends and Family: Not on file    Frequency of Social Gatherings with Friends and Family: Not on file    Attends Taoism Services: Not on file    Active Member of [address] [place] [address] or Organizations: Not on file    Attends Club or Organization Meetings: Not on file    Marital Status: Not on file   Intimate Partner Violence:     Fear of Current or Ex-Partner: Not on file    Emotionally Abused: Not on file    Physically Abused: Not on file    Sexually Abused: Not on file   Housing Stability:     Unable to Pay for Housing in the Last Year: Not on file    Number of [place] in the Last Year: Not on file    Unstable Housing in the Last Year: Not on file        History reviewed. No pertinent family history.     PE  Vitals:    07/12/22 0935 07/12/22 0940 07/12/22 0947   BP: (!) 150/78 138/80 (!) 150/90   Site: Right Upper Arm Left Upper Arm    Position: Sitting Sitting    Cuff Size: Medium Adult Medium Adult    Pulse: 86     SpO2: 99%     Weight: 159 lb (72.1 kg)     Height: 5' 8\" (1.727 m)       Estimated body mass index is 24.18 kg/m² as calculated from the following:    Height as of this encounter: 5' 8\" (1.727 m). Weight as of this encounter: 159 lb (72.1 kg). Physical Exam  Constitutional:       General: He is not in acute distress. Appearance: Normal appearance. He is not ill-appearing. HENT:      Head: Normocephalic. Right Ear: Tympanic membrane and external ear normal.      Left Ear: Tympanic membrane and external ear normal.      Nose: No congestion or rhinorrhea. Mouth/Throat:      Mouth: Mucous membranes are moist.      Pharynx: Oropharynx is clear. No posterior oropharyngeal erythema. Eyes:      Extraocular Movements: Extraocular movements intact. Conjunctiva/sclera: Conjunctivae normal.      Pupils: Pupils are equal, round, and reactive to light. Cardiovascular:      Rate and Rhythm: Normal rate and regular rhythm. Pulses: Normal pulses. Heart sounds: Normal heart sounds. No murmur heard. Pulmonary:      Effort: Pulmonary effort is normal. No respiratory distress. Breath sounds: Normal breath sounds. No wheezing. Abdominal:      General: Abdomen is flat. Bowel sounds are normal.      Palpations: Abdomen is soft. Tenderness: There is no abdominal tenderness. Hernia: No hernia is present. Musculoskeletal:         General: No swelling. Normal range of motion. Cervical back: Normal range of motion and neck supple. No tenderness. Right lower leg: No edema. Left lower leg: No edema. Lymphadenopathy:      Cervical: No cervical adenopathy. Skin:     General: Skin is warm and dry. Capillary Refill: Capillary refill takes less than 2 seconds. Neurological:      General: No focal deficit present. Mental Status: He is alert and oriented to person, place, and time. Mental status is at baseline. Cranial Nerves: No cranial nerve deficit.    Psychiatric:         Mood and Affect: Mood normal. Behavior: Behavior normal.         Judgment: Judgment normal.         Immunization History   Administered Date(s) Administered    MMR 05/08/2002       Health Maintenance   Topic Date Due    Varicella vaccine (1 of 2 - 2-dose childhood series) Never done    COVID-19 Vaccine (1) Never done    Pneumococcal 0-64 years Vaccine (1 - PCV) Never done    Depression Monitoring  Never done    Hepatitis C screen  07/12/2023 (Originally 2/2/2008)    HIV screen  07/12/2023 (Originally 2/2/2005)    Flu vaccine (1) 09/01/2022    DTaP/Tdap/Td vaccine (2 - Td or Tdap) 02/21/2023    Hepatitis A vaccine  Aged Out    Hepatitis B vaccine  Aged Out    Hib vaccine  Aged Out    Meningococcal (ACWY) vaccine  Aged Out       PSH, PMH, SH and FH reviewed and noted. Recent and past labs, tests and consults also reviewed. Recent or new meds also reviewed. King Florian, APRN - CNP    This dictation was generated by voice recognition computer software. Although all attempts are made to edit the dictation for accuracy, there may be errors in the transcription that are not intended.

## 2022-08-09 ENCOUNTER — OFFICE VISIT (OUTPATIENT)
Dept: FAMILY MEDICINE CLINIC | Age: 32
End: 2022-08-09
Payer: COMMERCIAL

## 2022-08-09 VITALS
SYSTOLIC BLOOD PRESSURE: 134 MMHG | HEART RATE: 58 BPM | OXYGEN SATURATION: 98 % | DIASTOLIC BLOOD PRESSURE: 84 MMHG | WEIGHT: 155 LBS | BODY MASS INDEX: 23.57 KG/M2

## 2022-08-09 DIAGNOSIS — F32.A DEPRESSION, UNSPECIFIED DEPRESSION TYPE: ICD-10-CM

## 2022-08-09 DIAGNOSIS — I10 PRIMARY HYPERTENSION: Primary | ICD-10-CM

## 2022-08-09 PROCEDURE — 99213 OFFICE O/P EST LOW 20 MIN: CPT

## 2022-08-09 RX ORDER — HYDROXYZINE HYDROCHLORIDE 25 MG/1
25 TABLET, FILM COATED ORAL EVERY 8 HOURS PRN
Qty: 30 TABLET | Refills: 0 | Status: SHIPPED | OUTPATIENT
Start: 2022-08-09 | End: 2022-08-19

## 2022-08-09 RX ORDER — HYDROCHLOROTHIAZIDE 12.5 MG/1
12.5 TABLET ORAL EVERY MORNING
Qty: 30 TABLET | Refills: 3 | Status: SHIPPED | OUTPATIENT
Start: 2022-08-09

## 2022-08-09 ASSESSMENT — PATIENT HEALTH QUESTIONNAIRE - PHQ9
SUM OF ALL RESPONSES TO PHQ9 QUESTIONS 1 & 2: 2
3. TROUBLE FALLING OR STAYING ASLEEP: 1
1. LITTLE INTEREST OR PLEASURE IN DOING THINGS: 1
SUM OF ALL RESPONSES TO PHQ QUESTIONS 1-9: 5
5. POOR APPETITE OR OVEREATING: 0
8. MOVING OR SPEAKING SO SLOWLY THAT OTHER PEOPLE COULD HAVE NOTICED. OR THE OPPOSITE, BEING SO FIGETY OR RESTLESS THAT YOU HAVE BEEN MOVING AROUND A LOT MORE THAN USUAL: 0
6. FEELING BAD ABOUT YOURSELF - OR THAT YOU ARE A FAILURE OR HAVE LET YOURSELF OR YOUR FAMILY DOWN: 0
SUM OF ALL RESPONSES TO PHQ QUESTIONS 1-9: 5
4. FEELING TIRED OR HAVING LITTLE ENERGY: 1
10. IF YOU CHECKED OFF ANY PROBLEMS, HOW DIFFICULT HAVE THESE PROBLEMS MADE IT FOR YOU TO DO YOUR WORK, TAKE CARE OF THINGS AT HOME, OR GET ALONG WITH OTHER PEOPLE: 1
9. THOUGHTS THAT YOU WOULD BE BETTER OFF DEAD, OR OF HURTING YOURSELF: 0
SUM OF ALL RESPONSES TO PHQ QUESTIONS 1-9: 5
7. TROUBLE CONCENTRATING ON THINGS, SUCH AS READING THE NEWSPAPER OR WATCHING TELEVISION: 1
2. FEELING DOWN, DEPRESSED OR HOPELESS: 1
SUM OF ALL RESPONSES TO PHQ QUESTIONS 1-9: 5

## 2022-08-09 ASSESSMENT — ENCOUNTER SYMPTOMS
DIARRHEA: 0
SORE THROAT: 0
EYE DISCHARGE: 0
ABDOMINAL PAIN: 0
CONSTIPATION: 0
COLOR CHANGE: 0
ABDOMINAL DISTENTION: 0
CHEST TIGHTNESS: 0
COUGH: 0
SHORTNESS OF BREATH: 0
EYE PAIN: 0

## 2022-08-09 NOTE — PROGRESS NOTES
[facility]    2022    Renee Guo (:  1990) is a 28 y.o. male, here to follow up on     Chief Complaint   Patient presents with    Depression    Hypertension     Started on hctz / pt said he is checking his bp a few time still a little high but better         ASSESSMENT/ PLAN  1. Primary hypertension  -Stable on hydrochlorothiazide 12.5 daily  -Continue    - hydroCHLOROthiazide (HYDRODIURIL) 12.5 MG tablet; Take 1 tablet by mouth every morning  Dispense: 30 tablet; Refill: 3    2. Depression, unspecified depression type  -Stable for the most part on Cymbalta. He still does have symptoms of breakthrough anxiety. Atarax as needed added  - hydrOXYzine HCl (ATARAX) 25 MG tablet; Take 1 tablet by mouth every 8 hours as needed for Anxiety  Dispense: 30 tablet; Refill: 0          Imaging:    Return in about 6 months (around 2023) for Regular follow up. Galdino ALFARO  Patient is here to establish care. He is here for evaluation of elevated BP. Apparently, he was treated for HTN in the past with HCTZ and he was controlled at that time. He has been off medication for about 4 years. He took himself off medication. He does state that he drinks about 180 milligrams of caffeine daily. He is also on Adderall for ADHD    Previously was on Trintellix for depression. Switch over to Cymbalta. Tolerating Cymbalta well. History of herpes simplex of the genitals. Smokes about a pack a day  Not sexually active at this time. Occasionally uses alcohol    Interval Hx:  Doing well since starting hydrochlorothiazide 12.5 mg. He has cut out caffeine considerably. Doing well with Cymbalta. He does have some breakthrough symptoms of anxiety even while on Cymbalta. ROS  Review of Systems   Constitutional:  Negative for chills, fever and unexpected weight change. HENT:  Negative for congestion, dental problem and sore throat. Eyes:  Negative for pain and discharge.    Respiratory: Negative for cough, chest tightness and shortness of breath. Cardiovascular:  Negative for chest pain, palpitations and leg swelling. Gastrointestinal:  Negative for abdominal distention, abdominal pain, constipation and diarrhea. Endocrine: Negative for polydipsia, polyphagia and polyuria. Genitourinary:  Negative for dysuria, flank pain, hematuria and urgency. Musculoskeletal:  Negative for arthralgias, joint swelling and myalgias. Skin:  Negative for color change and rash. Neurological:  Negative for dizziness, light-headedness, numbness and headaches. Psychiatric/Behavioral:  Negative for agitation and behavioral problems. The patient is not nervous/anxious. HISTORIES  Current Outpatient Medications on File Prior to Visit   Medication Sig Dispense Refill    DULoxetine (CYMBALTA) 60 MG extended release capsule Take 60 mg by mouth daily      DULoxetine (CYMBALTA) 30 MG extended release capsule Take 30 mg by mouth daily      amphetamine-dextroamphetamine (ADDERALL XR) 20 MG extended release capsule        No current facility-administered medications on file prior to visit.         Allergies   Allergen Reactions    Amoxicillin     Lisinopril Other (See Comments)     cough    Ampicillin Rash       Past Medical History:   Diagnosis Date    Hypertension        Patient Active Problem List   Diagnosis    Closed displaced fracture of fourth metatarsal bone of left foot    Displaced fracture of third metatarsal bone, left foot, initial encounter for closed fracture    Hematoma of left thigh    Laceration of finger of right hand without damage to nail    Crush injury of foot, left, initial encounter    Current smoker    Depression    Attention deficit hyperactivity disorder (ADHD), combined type    Primary hypertension       Past Surgical History:   Procedure Laterality Date    NOSE SURGERY         Social History     Socioeconomic History    Marital status: Single     Spouse name: Not on file    Number of children: Not on file    Years of education: Not on file    Highest education level: Not on file   Occupational History    Not on file   Tobacco Use    Smoking status: Every Day     Packs/day: 1.00     Years: 10.00     Pack years: 10.00     Types: Cigarettes    Smokeless tobacco: Never   Vaping Use    Vaping Use: Never used   Substance and Sexual Activity    Alcohol use: Yes     Comment: rarely    Drug use: No    Sexual activity: Yes     Partners: Female   Other Topics Concern    Not on file   Social History Narrative    Not on file     Social Determinants of Health     Financial Resource Strain: Not on file   Food Insecurity: Not on file   Transportation Needs: Not on file   Physical Activity: Not on file   Stress: Not on file   Social Connections: Not on file   Intimate Partner Violence: Not on file   Housing Stability: Not on file        No family history on file. PE  Vitals:    08/09/22 0859   BP: 134/84   Site: Right Upper Arm   Position: Sitting   Cuff Size: Medium Adult   Pulse: 58   SpO2: 98%   Weight: 155 lb (70.3 kg)     Estimated body mass index is 23.57 kg/m² as calculated from the following:    Height as of 7/12/22: 5' 8\" (1.727 m). Weight as of this encounter: 155 lb (70.3 kg). Physical Exam  Constitutional:       General: He is not in acute distress. Appearance: Normal appearance. He is not ill-appearing. HENT:      Head: Normocephalic. Right Ear: Tympanic membrane and external ear normal.      Left Ear: Tympanic membrane and external ear normal.      Nose: No congestion or rhinorrhea. Mouth/Throat:      Mouth: Mucous membranes are moist.      Pharynx: Oropharynx is clear. No posterior oropharyngeal erythema. Eyes:      Extraocular Movements: Extraocular movements intact. Conjunctiva/sclera: Conjunctivae normal.      Pupils: Pupils are equal, round, and reactive to light. Cardiovascular:      Rate and Rhythm: Normal rate and regular rhythm.       Pulses: Normal pulses. Heart sounds: Normal heart sounds. No murmur heard. Pulmonary:      Effort: Pulmonary effort is normal. No respiratory distress. Breath sounds: Normal breath sounds. No wheezing. Abdominal:      General: Abdomen is flat. Bowel sounds are normal.      Palpations: Abdomen is soft. Tenderness: There is no abdominal tenderness. Hernia: No hernia is present. Musculoskeletal:         General: No swelling. Normal range of motion. Cervical back: Normal range of motion and neck supple. No tenderness. Right lower leg: No edema. Left lower leg: No edema. Lymphadenopathy:      Cervical: No cervical adenopathy. Skin:     General: Skin is warm and dry. Capillary Refill: Capillary refill takes less than 2 seconds. Neurological:      General: No focal deficit present. Mental Status: He is alert and oriented to person, place, and time. Mental status is at baseline. Cranial Nerves: No cranial nerve deficit. Psychiatric:         Mood and Affect: Mood normal.         Behavior: Behavior normal.         Judgment: Judgment normal.       Immunization History   Administered Date(s) Administered    MMR 05/08/2002       Health Maintenance   Topic Date Due    COVID-19 Vaccine (1) Never done    Varicella vaccine (1 of 2 - 2-dose childhood series) Never done    Pneumococcal 0-64 years Vaccine (1 - PCV) Never done    Hepatitis C screen  07/12/2023 (Originally 2/2/2008)    HIV screen  07/12/2023 (Originally 2/2/2005)    Flu vaccine (1) 09/01/2022    DTaP/Tdap/Td vaccine (2 - Td or Tdap) 02/21/2023    Depression Monitoring  07/12/2023    Hepatitis A vaccine  Aged Out    Hepatitis B vaccine  Aged Out    Hib vaccine  Aged Out    Meningococcal (ACWY) vaccine  Aged Out       PSH, PMH, SH and FH reviewed and noted. Recent and past labs, tests and consults also reviewed. Recent or new meds also reviewed.     Muna Gray, APRN - CNP    This dictation was generated by voice

## 2023-02-08 ENCOUNTER — OFFICE VISIT (OUTPATIENT)
Dept: FAMILY MEDICINE CLINIC | Age: 33
End: 2023-02-08
Payer: COMMERCIAL

## 2023-02-08 VITALS
OXYGEN SATURATION: 99 % | WEIGHT: 159 LBS | SYSTOLIC BLOOD PRESSURE: 138 MMHG | HEART RATE: 88 BPM | BODY MASS INDEX: 24.18 KG/M2 | DIASTOLIC BLOOD PRESSURE: 88 MMHG

## 2023-02-08 DIAGNOSIS — I10 PRIMARY HYPERTENSION: Primary | ICD-10-CM

## 2023-02-08 DIAGNOSIS — F32.A DEPRESSION, UNSPECIFIED DEPRESSION TYPE: ICD-10-CM

## 2023-02-08 PROCEDURE — 3079F DIAST BP 80-89 MM HG: CPT

## 2023-02-08 PROCEDURE — 99214 OFFICE O/P EST MOD 30 MIN: CPT

## 2023-02-08 PROCEDURE — 3075F SYST BP GE 130 - 139MM HG: CPT

## 2023-02-08 RX ORDER — LOSARTAN POTASSIUM 25 MG/1
25 TABLET ORAL DAILY
Qty: 30 TABLET | Refills: 0 | Status: SHIPPED | OUTPATIENT
Start: 2023-02-08

## 2023-02-08 RX ORDER — DULOXETIN HYDROCHLORIDE 60 MG/1
60 CAPSULE, DELAYED RELEASE ORAL DAILY
Qty: 90 CAPSULE | Refills: 1 | Status: SHIPPED | OUTPATIENT
Start: 2023-02-08

## 2023-02-08 ASSESSMENT — PATIENT HEALTH QUESTIONNAIRE - PHQ9
8. MOVING OR SPEAKING SO SLOWLY THAT OTHER PEOPLE COULD HAVE NOTICED. OR THE OPPOSITE, BEING SO FIGETY OR RESTLESS THAT YOU HAVE BEEN MOVING AROUND A LOT MORE THAN USUAL: 0
9. THOUGHTS THAT YOU WOULD BE BETTER OFF DEAD, OR OF HURTING YOURSELF: 0
SUM OF ALL RESPONSES TO PHQ9 QUESTIONS 1 & 2: 4
1. LITTLE INTEREST OR PLEASURE IN DOING THINGS: 2
SUM OF ALL RESPONSES TO PHQ QUESTIONS 1-9: 8
5. POOR APPETITE OR OVEREATING: 0
SUM OF ALL RESPONSES TO PHQ QUESTIONS 1-9: 8
SUM OF ALL RESPONSES TO PHQ QUESTIONS 1-9: 8
7. TROUBLE CONCENTRATING ON THINGS, SUCH AS READING THE NEWSPAPER OR WATCHING TELEVISION: 2
SUM OF ALL RESPONSES TO PHQ QUESTIONS 1-9: 8
3. TROUBLE FALLING OR STAYING ASLEEP: 2
10. IF YOU CHECKED OFF ANY PROBLEMS, HOW DIFFICULT HAVE THESE PROBLEMS MADE IT FOR YOU TO DO YOUR WORK, TAKE CARE OF THINGS AT HOME, OR GET ALONG WITH OTHER PEOPLE: 1
2. FEELING DOWN, DEPRESSED OR HOPELESS: 2
4. FEELING TIRED OR HAVING LITTLE ENERGY: 0
6. FEELING BAD ABOUT YOURSELF - OR THAT YOU ARE A FAILURE OR HAVE LET YOURSELF OR YOUR FAMILY DOWN: 0

## 2023-02-08 ASSESSMENT — ENCOUNTER SYMPTOMS
SORE THROAT: 0
SHORTNESS OF BREATH: 0
COUGH: 0
COLOR CHANGE: 0
CHEST TIGHTNESS: 0
ABDOMINAL DISTENTION: 0
DIARRHEA: 0
CONSTIPATION: 0
EYE PAIN: 0
EYE DISCHARGE: 0
ABDOMINAL PAIN: 0

## 2023-02-08 NOTE — PROGRESS NOTES
[facility] Medicine    2023    Wally Kiser (:  1990) is a 35 y.o. male, here to follow up on     Chief Complaint   Patient presents with    Depression     Pt said he is doing well on medication    Hypertension     Pt is asking for a change in medication he said the hctz is making him use the restroom way to much   Pt has been off the medication for 3 weeks     ADHD        ASSESSMENT/ PLAN  1. Primary hypertension  -Stop hydrochlorothiazide due to patient preference  -Start losartan 25 mg  -Follow-up in 2 weeks for blood pressure check  -We did discuss annual labs that should be started. Patient declines to do so today. He states that we will start this next year. - losartan (COZAAR) 25 MG tablet; Take 1 tablet by mouth daily  Dispense: 30 tablet; Refill: 0    2. Depression, unspecified depression type  -Stable on Cymbalta  -Continue  - DULoxetine (CYMBALTA) 60 MG extended release capsule; Take 1 capsule by mouth daily  Dispense: 90 capsule; Refill: 1          Imaging:    Return in about 1 year (around 2024) for Annual physical and HTN. [facility]  Patient is here to establish care. He is here for evaluation of elevated BP. Apparently, he was treated for HTN in the past with HCTZ and he was controlled at that time. He has been off medication for about 4 years. He took himself off medication. He does state that he drinks about 180 milligrams of caffeine daily. He is also on Adderall for ADHD    Previously was on Trintellix for depression. Switch over to Cymbalta. Tolerating Cymbalta well. History of herpes simplex of the genitals. Smokes about a pack a day  Not sexually active at this time. Occasionally uses alcohol    Interval Hx 2023:  She is here to follow-up on hypertension and depression symptoms. He has been on hydrochlorothiazide 12.5 mg in the past.  He reports that he has been out of hydrochlorothiazide for about 2 weeks.   Reports that he would like to switch to different medication for HTN. Depression is stable on cymbalta. Denies any adverse          ROS  Review of Systems   Constitutional:  Negative for chills, fever and unexpected weight change. HENT:  Negative for congestion, dental problem and sore throat. Eyes:  Negative for pain and discharge. Respiratory:  Negative for cough, chest tightness and shortness of breath. Cardiovascular:  Negative for chest pain, palpitations and leg swelling. Gastrointestinal:  Negative for abdominal distention, abdominal pain, constipation and diarrhea. Endocrine: Negative for polydipsia, polyphagia and polyuria. Genitourinary:  Negative for dysuria, flank pain, hematuria and urgency. Musculoskeletal:  Negative for arthralgias, joint swelling and myalgias. Skin:  Negative for color change and rash. Neurological:  Negative for dizziness, light-headedness, numbness and headaches. Psychiatric/Behavioral:  Negative for agitation and behavioral problems. The patient is not nervous/anxious. HISTORIES  Current Outpatient Medications on File Prior to Visit   Medication Sig Dispense Refill    amphetamine-dextroamphetamine (ADDERALL XR) 20 MG extended release capsule       hydroCHLOROthiazide (HYDRODIURIL) 12.5 MG tablet Take 1 tablet by mouth every morning (Patient not taking: Reported on 2/8/2023) 30 tablet 3     No current facility-administered medications on file prior to visit.         Allergies   Allergen Reactions    Amoxicillin     Lisinopril Other (See Comments)     cough    Ampicillin Rash       Past Medical History:   Diagnosis Date    Hypertension        Patient Active Problem List   Diagnosis    Closed displaced fracture of fourth metatarsal bone of left foot    Displaced fracture of third metatarsal bone, left foot, initial encounter for closed fracture    Hematoma of left thigh    Laceration of finger of right hand without damage to nail    Crush injury of foot, left, initial encounter Current smoker    Depression    Attention deficit hyperactivity disorder (ADHD), combined type    Primary hypertension       Past Surgical History:   Procedure Laterality Date    NOSE SURGERY         Social History     Socioeconomic History    Marital status: Single     Spouse name: Not on file    Number of children: Not on file    Years of education: Not on file    Highest education level: Not on file   Occupational History    Not on file   Tobacco Use    Smoking status: Every Day     Packs/day: 1.00     Years: 10.00     Pack years: 10.00     Types: Cigarettes    Smokeless tobacco: Never   Vaping Use    Vaping Use: Never used   Substance and Sexual Activity    Alcohol use: Yes     Comment: rarely    Drug use: No    Sexual activity: Yes     Partners: Female   Other Topics Concern    Not on file   Social History Narrative    Not on file     Social Determinants of Health     Financial Resource Strain: Not on file   Food Insecurity: Not on file   Transportation Needs: Not on file   Physical Activity: Not on file   Stress: Not on file   Social Connections: Not on file   Intimate Partner Violence: Not on file   Housing Stability: Not on file        No family history on file. PE  Vitals:    02/08/23 0953 02/08/23 0956 02/08/23 1002   BP: (!) 142/94 (!) 144/89 138/88   Site: Left Upper Arm Left Upper Arm    Position: Sitting Sitting    Cuff Size: Medium Adult Medium Adult    Pulse: 88     SpO2: 99%     Weight: 159 lb (72.1 kg)       Estimated body mass index is 24.18 kg/m² as calculated from the following:    Height as of 7/12/22: 5' 8\" (1.727 m). Weight as of this encounter: 159 lb (72.1 kg). Physical Exam  Constitutional:       General: He is not in acute distress. Appearance: Normal appearance. He is not ill-appearing. HENT:      Head: Normocephalic. Right Ear: Tympanic membrane and external ear normal.      Left Ear: Tympanic membrane and external ear normal.      Nose: No congestion or rhinorrhea. Mouth/Throat:      Mouth: Mucous membranes are moist.      Pharynx: Oropharynx is clear. No posterior oropharyngeal erythema. Eyes:      Extraocular Movements: Extraocular movements intact. Conjunctiva/sclera: Conjunctivae normal.      Pupils: Pupils are equal, round, and reactive to light. Cardiovascular:      Rate and Rhythm: Normal rate and regular rhythm. Pulses: Normal pulses. Heart sounds: Normal heart sounds. No murmur heard. Pulmonary:      Effort: Pulmonary effort is normal. No respiratory distress. Breath sounds: Normal breath sounds. No wheezing. Abdominal:      General: Abdomen is flat. Bowel sounds are normal.      Palpations: Abdomen is soft. Tenderness: There is no abdominal tenderness. Hernia: No hernia is present. Musculoskeletal:         General: No swelling. Normal range of motion. Cervical back: Normal range of motion and neck supple. No tenderness. Right lower leg: No edema. Left lower leg: No edema. Lymphadenopathy:      Cervical: No cervical adenopathy. Skin:     General: Skin is warm and dry. Capillary Refill: Capillary refill takes less than 2 seconds. Neurological:      General: No focal deficit present. Mental Status: He is alert and oriented to person, place, and time. Mental status is at baseline. Cranial Nerves: No cranial nerve deficit.    Psychiatric:         Mood and Affect: Mood normal.         Behavior: Behavior normal.         Judgment: Judgment normal.       Immunization History   Administered Date(s) Administered    MMR 05/08/2002       Health Maintenance   Topic Date Due    COVID-19 Vaccine (1) Never done    Varicella vaccine (1 of 2 - 2-dose childhood series) Never done    Pneumococcal 0-64 years Vaccine (1 - PCV) Never done    Hepatitis C screen  07/12/2023 (Originally 2/2/2008)    HIV screen  07/12/2023 (Originally 2/2/2005)    Flu vaccine (1) 02/08/2024 (Originally 8/1/2022)    DTaP/Tdap/Td vaccine (2 - Td or Tdap) 02/21/2023    Depression Monitoring  08/09/2023    Hepatitis A vaccine  Aged Out    Hib vaccine  Aged Out    Meningococcal (ACWY) vaccine  Aged Out       PSH, PMH, SH and FH reviewed and noted. Recent and past labs, tests and consults also reviewed. Recent or new meds also reviewed. Rafi Albert, APRN - CNP    This dictation was generated by voice recognition computer software. Although all attempts are made to edit the dictation for accuracy, there may be errors in the transcription that are not intended.

## 2023-02-24 ENCOUNTER — TELEPHONE (OUTPATIENT)
Dept: FAMILY MEDICINE CLINIC | Age: 33
End: 2023-02-24

## 2023-02-24 NOTE — TELEPHONE ENCOUNTER
Pt was here for bp check and reading were  148/90 hr 93  146/90 hr 76  151/95 hr 73    Pt stated he had taken his medication on his way here and does not check bp at home but takes his medication everyday.

## 2023-02-27 DIAGNOSIS — I10 PRIMARY HYPERTENSION: ICD-10-CM

## 2023-02-27 RX ORDER — LOSARTAN POTASSIUM 25 MG/1
TABLET ORAL
Qty: 30 TABLET | Refills: 3 | Status: SHIPPED | OUTPATIENT
Start: 2023-02-27

## 2023-03-24 ENCOUNTER — OFFICE VISIT (OUTPATIENT)
Dept: FAMILY MEDICINE CLINIC | Age: 33
End: 2023-03-24
Payer: COMMERCIAL

## 2023-03-24 VITALS
DIASTOLIC BLOOD PRESSURE: 84 MMHG | WEIGHT: 156 LBS | SYSTOLIC BLOOD PRESSURE: 128 MMHG | OXYGEN SATURATION: 100 % | BODY MASS INDEX: 23.64 KG/M2 | HEIGHT: 68 IN | HEART RATE: 70 BPM

## 2023-03-24 DIAGNOSIS — I10 PRIMARY HYPERTENSION: Primary | ICD-10-CM

## 2023-03-24 PROCEDURE — 3079F DIAST BP 80-89 MM HG: CPT

## 2023-03-24 PROCEDURE — 99213 OFFICE O/P EST LOW 20 MIN: CPT

## 2023-03-24 PROCEDURE — 3074F SYST BP LT 130 MM HG: CPT

## 2023-03-24 SDOH — ECONOMIC STABILITY: HOUSING INSECURITY
IN THE LAST 12 MONTHS, WAS THERE A TIME WHEN YOU DID NOT HAVE A STEADY PLACE TO SLEEP OR SLEPT IN A SHELTER (INCLUDING NOW)?: NO

## 2023-03-24 SDOH — ECONOMIC STABILITY: FOOD INSECURITY: WITHIN THE PAST 12 MONTHS, THE FOOD YOU BOUGHT JUST DIDN'T LAST AND YOU DIDN'T HAVE MONEY TO GET MORE.: NEVER TRUE

## 2023-03-24 SDOH — ECONOMIC STABILITY: INCOME INSECURITY: HOW HARD IS IT FOR YOU TO PAY FOR THE VERY BASICS LIKE FOOD, HOUSING, MEDICAL CARE, AND HEATING?: NOT HARD AT ALL

## 2023-03-24 SDOH — ECONOMIC STABILITY: FOOD INSECURITY: WITHIN THE PAST 12 MONTHS, YOU WORRIED THAT YOUR FOOD WOULD RUN OUT BEFORE YOU GOT MONEY TO BUY MORE.: NEVER TRUE

## 2023-03-24 ASSESSMENT — ENCOUNTER SYMPTOMS
DIARRHEA: 0
SHORTNESS OF BREATH: 0
EYE PAIN: 0
ABDOMINAL DISTENTION: 0
COUGH: 0
EYE DISCHARGE: 0
SORE THROAT: 0
CHEST TIGHTNESS: 0
ABDOMINAL PAIN: 0
COLOR CHANGE: 0
CONSTIPATION: 0

## 2023-03-24 NOTE — PROGRESS NOTES
swelling. Gastrointestinal:  Negative for abdominal distention, abdominal pain, constipation and diarrhea. Endocrine: Negative for polydipsia, polyphagia and polyuria. Genitourinary:  Negative for dysuria, flank pain, hematuria and urgency. Musculoskeletal:  Negative for arthralgias, joint swelling and myalgias. Skin:  Negative for color change and rash. Neurological:  Negative for dizziness, light-headedness, numbness and headaches. Psychiatric/Behavioral:  Negative for agitation and behavioral problems. The patient is not nervous/anxious. HISTORIES  Current Outpatient Medications on File Prior to Visit   Medication Sig Dispense Refill    losartan (COZAAR) 25 MG tablet TAKE ONE TABLET BY MOUTH DAILY 30 tablet 3    DULoxetine (CYMBALTA) 60 MG extended release capsule Take 1 capsule by mouth daily 90 capsule 1    amphetamine-dextroamphetamine (ADDERALL XR) 20 MG extended release capsule        No current facility-administered medications on file prior to visit.         Allergies   Allergen Reactions    Amoxicillin     Lisinopril Other (See Comments)     cough    Ampicillin Rash       Past Medical History:   Diagnosis Date    Hypertension        Patient Active Problem List   Diagnosis    Closed displaced fracture of fourth metatarsal bone of left foot    Displaced fracture of third metatarsal bone, left foot, initial encounter for closed fracture    Hematoma of left thigh    Laceration of finger of right hand without damage to nail    Crush injury of foot, left, initial encounter    Current smoker    Depression    Attention deficit hyperactivity disorder (ADHD), combined type    Primary hypertension       Past Surgical History:   Procedure Laterality Date    NOSE SURGERY         Social History     Socioeconomic History    Marital status: Single     Spouse name: Not on file    Number of children: Not on file    Years of education: Not on file    Highest education level: Not on file   Occupational

## 2023-07-11 DIAGNOSIS — I10 PRIMARY HYPERTENSION: ICD-10-CM

## 2023-07-11 RX ORDER — LOSARTAN POTASSIUM 25 MG/1
25 TABLET ORAL DAILY
Qty: 30 TABLET | Refills: 3 | Status: SHIPPED | OUTPATIENT
Start: 2023-07-11

## 2023-12-11 DIAGNOSIS — F32.A DEPRESSION, UNSPECIFIED DEPRESSION TYPE: ICD-10-CM

## 2023-12-11 DIAGNOSIS — I10 PRIMARY HYPERTENSION: ICD-10-CM

## 2023-12-11 RX ORDER — DULOXETIN HYDROCHLORIDE 60 MG/1
60 CAPSULE, DELAYED RELEASE ORAL DAILY
Qty: 90 CAPSULE | Refills: 1 | Status: SHIPPED | OUTPATIENT
Start: 2023-12-11

## 2023-12-11 RX ORDER — LOSARTAN POTASSIUM 25 MG/1
25 TABLET ORAL DAILY
Qty: 30 TABLET | Refills: 3 | Status: SHIPPED | OUTPATIENT
Start: 2023-12-11

## 2024-04-13 DIAGNOSIS — I10 PRIMARY HYPERTENSION: ICD-10-CM

## 2024-04-14 DIAGNOSIS — I10 PRIMARY HYPERTENSION: ICD-10-CM

## 2024-04-15 RX ORDER — LOSARTAN POTASSIUM 25 MG/1
25 TABLET ORAL DAILY
Qty: 30 TABLET | Refills: 3 | OUTPATIENT
Start: 2024-04-15

## 2024-04-15 RX ORDER — LOSARTAN POTASSIUM 25 MG/1
25 TABLET ORAL DAILY
Qty: 30 TABLET | Refills: 0 | Status: SHIPPED | OUTPATIENT
Start: 2024-04-15

## 2024-05-14 DIAGNOSIS — I10 PRIMARY HYPERTENSION: ICD-10-CM

## 2024-05-14 NOTE — TELEPHONE ENCOUNTER
Last seen 3/24/23, told to fu in 1 yr, no appt scheduled.  Called patient and left a vm for him to make appt

## 2024-05-15 RX ORDER — LOSARTAN POTASSIUM 25 MG/1
25 TABLET ORAL DAILY
Qty: 30 TABLET | Refills: 3 | Status: SHIPPED | OUTPATIENT
Start: 2024-05-15 | End: 2024-05-16 | Stop reason: SDUPTHER

## 2024-05-16 ENCOUNTER — OFFICE VISIT (OUTPATIENT)
Dept: FAMILY MEDICINE CLINIC | Age: 34
End: 2024-05-16

## 2024-05-16 VITALS
BODY MASS INDEX: 24.71 KG/M2 | WEIGHT: 163 LBS | DIASTOLIC BLOOD PRESSURE: 80 MMHG | HEIGHT: 68 IN | SYSTOLIC BLOOD PRESSURE: 128 MMHG

## 2024-05-16 DIAGNOSIS — Z87.891 PERSONAL HISTORY OF TOBACCO USE, PRESENTING HAZARDS TO HEALTH: ICD-10-CM

## 2024-05-16 DIAGNOSIS — I10 PRIMARY HYPERTENSION: ICD-10-CM

## 2024-05-16 DIAGNOSIS — F90.2 ATTENTION DEFICIT HYPERACTIVITY DISORDER (ADHD), COMBINED TYPE: ICD-10-CM

## 2024-05-16 DIAGNOSIS — F32.A DEPRESSION, UNSPECIFIED DEPRESSION TYPE: ICD-10-CM

## 2024-05-16 DIAGNOSIS — Z00.00 ENCOUNTER FOR WELL ADULT EXAM WITHOUT ABNORMAL FINDINGS: Primary | ICD-10-CM

## 2024-05-16 RX ORDER — LOSARTAN POTASSIUM 25 MG/1
25 TABLET ORAL DAILY
Qty: 90 TABLET | Refills: 3 | Status: SHIPPED | OUTPATIENT
Start: 2024-05-16

## 2024-05-16 SDOH — ECONOMIC STABILITY: FOOD INSECURITY: WITHIN THE PAST 12 MONTHS, YOU WORRIED THAT YOUR FOOD WOULD RUN OUT BEFORE YOU GOT MONEY TO BUY MORE.: NEVER TRUE

## 2024-05-16 SDOH — ECONOMIC STABILITY: INCOME INSECURITY: HOW HARD IS IT FOR YOU TO PAY FOR THE VERY BASICS LIKE FOOD, HOUSING, MEDICAL CARE, AND HEATING?: NOT HARD AT ALL

## 2024-05-16 SDOH — ECONOMIC STABILITY: FOOD INSECURITY: WITHIN THE PAST 12 MONTHS, THE FOOD YOU BOUGHT JUST DIDN'T LAST AND YOU DIDN'T HAVE MONEY TO GET MORE.: NEVER TRUE

## 2024-05-16 ASSESSMENT — PATIENT HEALTH QUESTIONNAIRE - PHQ9
1. LITTLE INTEREST OR PLEASURE IN DOING THINGS: NOT AT ALL
2. FEELING DOWN, DEPRESSED OR HOPELESS: NOT AT ALL
5. POOR APPETITE OR OVEREATING: NOT AT ALL
SUM OF ALL RESPONSES TO PHQ QUESTIONS 1-9: 0
8. MOVING OR SPEAKING SO SLOWLY THAT OTHER PEOPLE COULD HAVE NOTICED. OR THE OPPOSITE, BEING SO FIGETY OR RESTLESS THAT YOU HAVE BEEN MOVING AROUND A LOT MORE THAN USUAL: NOT AT ALL
SUM OF ALL RESPONSES TO PHQ QUESTIONS 1-9: 0
9. THOUGHTS THAT YOU WOULD BE BETTER OFF DEAD, OR OF HURTING YOURSELF: NOT AT ALL
3. TROUBLE FALLING OR STAYING ASLEEP: NOT AT ALL
4. FEELING TIRED OR HAVING LITTLE ENERGY: NOT AT ALL
6. FEELING BAD ABOUT YOURSELF - OR THAT YOU ARE A FAILURE OR HAVE LET YOURSELF OR YOUR FAMILY DOWN: NOT AT ALL
10. IF YOU CHECKED OFF ANY PROBLEMS, HOW DIFFICULT HAVE THESE PROBLEMS MADE IT FOR YOU TO DO YOUR WORK, TAKE CARE OF THINGS AT HOME, OR GET ALONG WITH OTHER PEOPLE: NOT DIFFICULT AT ALL
7. TROUBLE CONCENTRATING ON THINGS, SUCH AS READING THE NEWSPAPER OR WATCHING TELEVISION: NOT AT ALL
SUM OF ALL RESPONSES TO PHQ9 QUESTIONS 1 & 2: 0

## 2024-05-16 ASSESSMENT — ENCOUNTER SYMPTOMS
SHORTNESS OF BREATH: 0
ABDOMINAL PAIN: 0
COUGH: 0
COLOR CHANGE: 0
CONSTIPATION: 0
CHEST TIGHTNESS: 0
ABDOMINAL DISTENTION: 0
EYE PAIN: 0
EYE DISCHARGE: 0

## 2024-05-16 NOTE — PROGRESS NOTES
Well Adult Note  Name: Luciano Ernst Today’s Date: 2024   MRN: [id number] Sex: Male   Age: 34 y.o. Ethnicity: Non- / Non    : 1990 Race: White (non-)      Luciano Ernst is here for well adult exam.  History:  Patient seen in office today for routine annual exam.  Denies any complaints    Still on losartan doing well    Review of Systems   Constitutional:  Negative for chills, fever and unexpected weight change.   HENT:  Negative for congestion, dental problem and sore throat.    Eyes:  Negative for pain and discharge.   Respiratory:  Negative for cough, chest tightness and shortness of breath.    Cardiovascular:  Negative for chest pain, palpitations and leg swelling.   Gastrointestinal:  Negative for abdominal distention, abdominal pain, constipation and diarrhea.   Endocrine: Negative for polydipsia, polyphagia and polyuria.   Genitourinary:  Negative for dysuria, flank pain, hematuria and urgency.   Musculoskeletal:  Negative for arthralgias, joint swelling and myalgias.   Skin:  Negative for color change and rash.   Neurological:  Negative for dizziness, light-headedness, numbness and headaches.   Psychiatric/Behavioral:  Negative for agitation and behavioral problems. The patient is not nervous/anxious.        Allergies   Allergen Reactions    Amoxicillin     Lisinopril Other (See Comments)     cough    Ampicillin Rash         Prior to Visit Medications    Medication Sig Taking? Authorizing Provider   losartan (COZAAR) 25 MG tablet Take 1 tablet by mouth daily Yes Fermin Callejas, APRN - CNP   DULoxetine (CYMBALTA) 60 MG extended release capsule Take 1 capsule by mouth daily Yes Fermin Callejas, APRN - CNP   amphetamine-dextroamphetamine (ADDERALL XR) 20 MG extended release capsule  Yes Provider, MD Minerva         Past Medical History:   Diagnosis Date    Hypertension        Past Surgical History:   Procedure Laterality Date    NOSE SURGERY         No

## 2024-05-16 NOTE — PATIENT INSTRUCTIONS
18, or you smoke fewer than 10 cigarettes per day, talk to your caregiver before taking any nicotine replacement medicines.  You should stop using a nicotine replacement product and call your caregiver if you experience nausea, dizziness, weakness, vomiting, fast or irregular heartbeat, mouth problems with the lozenge or gum, or redness or swelling of the skin around the patch that does not go away.  Do not use any other product containing nicotine while using a nicotine replacement product.  Talk to your caregiver before using these products if you have diabetes, heart disease, asthma, stomach ulcers, you had a recent heart attack, you have high blood pressure that is not controlled with medicine, a history of irregular heartbeat, or you have been prescribed medicine to help you quit smoking.  5. BE PREPARED FOR RELAPSE OR DIFFICULT SITUATIONS  Most relapses occur within the first 3 months after quitting. Do not be discouraged if you start smoking again. Remember, most people try several times before they finally quit.  You may have symptoms of withdrawal because your body is used to nicotine. You may crave cigarettes, be irritable, feel very hungry, cough often, get headaches, or have difficulty concentrating.  The withdrawal symptoms are only temporary. They are strongest when you first quit, but they will go away within 10 to 14 days.  Here are some difficult situations to watch for:  Alcohol. Avoid drinking alcohol. Drinking lowers your chances of successfully quitting.  Caffeine. Try to reduce the amount of caffeine you consume. It also lowers your chances of successfully quitting.  Other smokers. Being around smoking can make you want to smoke. Avoid smokers.  Weight gain. Many smokers will gain weight when they quit, usually less than 10 pounds. Eat a healthy diet and stay active. Do not let weight gain distract you from your main goal, quitting smoking. Some medicines that help you quit smoking may also

## 2024-08-27 ENCOUNTER — HOSPITAL ENCOUNTER (OUTPATIENT)
Dept: GENERAL RADIOLOGY | Age: 34
Discharge: HOME | End: 2024-08-27
Payer: COMMERCIAL

## 2024-08-27 ENCOUNTER — HOSPITAL ENCOUNTER (OUTPATIENT)
Age: 34
Discharge: HOME | End: 2024-08-27

## 2024-08-27 DIAGNOSIS — S97.82XA CRUSHING INJURY OF LEFT FOOT, INITIAL ENCOUNTER: ICD-10-CM

## 2024-08-27 DIAGNOSIS — S92.332A CLOSED DISPLACED FRACTURE OF THIRD METATARSAL BONE OF LEFT FOOT, INITIAL ENCOUNTER: ICD-10-CM

## 2024-08-27 DIAGNOSIS — S92.342A CLOSED DISPLACED FRACTURE OF FOURTH METATARSAL BONE OF LEFT FOOT, INITIAL ENCOUNTER: ICD-10-CM

## 2024-08-27 PROCEDURE — 73630 X-RAY EXAM OF FOOT: CPT

## 2025-04-01 ENCOUNTER — PATIENT MESSAGE (OUTPATIENT)
Dept: FAMILY MEDICINE CLINIC | Age: 35
End: 2025-04-01

## 2025-06-19 ENCOUNTER — OFFICE VISIT (OUTPATIENT)
Dept: FAMILY MEDICINE CLINIC | Age: 35
End: 2025-06-19
Payer: COMMERCIAL

## 2025-06-19 VITALS
DIASTOLIC BLOOD PRESSURE: 72 MMHG | HEIGHT: 68 IN | WEIGHT: 147 LBS | BODY MASS INDEX: 22.28 KG/M2 | SYSTOLIC BLOOD PRESSURE: 128 MMHG

## 2025-06-19 DIAGNOSIS — Z72.0 TOBACCO ABUSE: ICD-10-CM

## 2025-06-19 DIAGNOSIS — F90.2 ATTENTION DEFICIT HYPERACTIVITY DISORDER (ADHD), COMBINED TYPE: ICD-10-CM

## 2025-06-19 DIAGNOSIS — I10 PRIMARY HYPERTENSION: ICD-10-CM

## 2025-06-19 DIAGNOSIS — Z00.00 ENCOUNTER FOR WELL ADULT EXAM WITHOUT ABNORMAL FINDINGS: Primary | ICD-10-CM

## 2025-06-19 PROCEDURE — 3078F DIAST BP <80 MM HG: CPT

## 2025-06-19 PROCEDURE — 99395 PREV VISIT EST AGE 18-39: CPT

## 2025-06-19 PROCEDURE — 99406 BEHAV CHNG SMOKING 3-10 MIN: CPT

## 2025-06-19 PROCEDURE — 3074F SYST BP LT 130 MM HG: CPT

## 2025-06-19 SDOH — ECONOMIC STABILITY: FOOD INSECURITY: WITHIN THE PAST 12 MONTHS, THE FOOD YOU BOUGHT JUST DIDN'T LAST AND YOU DIDN'T HAVE MONEY TO GET MORE.: NEVER TRUE

## 2025-06-19 SDOH — ECONOMIC STABILITY: FOOD INSECURITY: WITHIN THE PAST 12 MONTHS, YOU WORRIED THAT YOUR FOOD WOULD RUN OUT BEFORE YOU GOT MONEY TO BUY MORE.: NEVER TRUE

## 2025-06-19 ASSESSMENT — PATIENT HEALTH QUESTIONNAIRE - PHQ9
SUM OF ALL RESPONSES TO PHQ QUESTIONS 1-9: 2
10. IF YOU CHECKED OFF ANY PROBLEMS, HOW DIFFICULT HAVE THESE PROBLEMS MADE IT FOR YOU TO DO YOUR WORK, TAKE CARE OF THINGS AT HOME, OR GET ALONG WITH OTHER PEOPLE: NOT DIFFICULT AT ALL
8. MOVING OR SPEAKING SO SLOWLY THAT OTHER PEOPLE COULD HAVE NOTICED. OR THE OPPOSITE, BEING SO FIGETY OR RESTLESS THAT YOU HAVE BEEN MOVING AROUND A LOT MORE THAN USUAL: NOT AT ALL
SUM OF ALL RESPONSES TO PHQ QUESTIONS 1-9: 2
5. POOR APPETITE OR OVEREATING: NOT AT ALL
3. TROUBLE FALLING OR STAYING ASLEEP: NOT AT ALL
6. FEELING BAD ABOUT YOURSELF - OR THAT YOU ARE A FAILURE OR HAVE LET YOURSELF OR YOUR FAMILY DOWN: NOT AT ALL
4. FEELING TIRED OR HAVING LITTLE ENERGY: NOT AT ALL
SUM OF ALL RESPONSES TO PHQ QUESTIONS 1-9: 2
7. TROUBLE CONCENTRATING ON THINGS, SUCH AS READING THE NEWSPAPER OR WATCHING TELEVISION: NOT AT ALL
SUM OF ALL RESPONSES TO PHQ QUESTIONS 1-9: 2
9. THOUGHTS THAT YOU WOULD BE BETTER OFF DEAD, OR OF HURTING YOURSELF: NOT AT ALL
2. FEELING DOWN, DEPRESSED OR HOPELESS: SEVERAL DAYS
1. LITTLE INTEREST OR PLEASURE IN DOING THINGS: SEVERAL DAYS

## 2025-06-19 ASSESSMENT — ENCOUNTER SYMPTOMS
CHEST TIGHTNESS: 0
SORE THROAT: 0
COUGH: 0
COLOR CHANGE: 0
DIARRHEA: 0
ABDOMINAL PAIN: 0
ABDOMINAL DISTENTION: 0
EYE DISCHARGE: 0
CONSTIPATION: 0
SHORTNESS OF BREATH: 0
EYE PAIN: 0

## 2025-06-19 NOTE — PROGRESS NOTES
Well Adult Note  Name: Luciano Ernst Today’s Date: 2025   MRN: [id number] Sex: Male   Age: 35 y.o. Ethnicity: Non- / Non    : 1990 Race: White (non-)      Luciano Ernst is here for a well adult exam.       Assessment & Plan   1. Encounter for well adult exam without abnormal findings  -Annual physical today.  Overall no new complaints  - Hemoglobin A1C; Future  - Comprehensive Metabolic Panel; Future  - CBC with Auto Differential; Future  - Lipid, Fasting; Future    2. Attention deficit hyperactivity disorder (ADHD), combined type  -Adderall being filled by Dr. Westbrook of psychiatry    3. Primary hypertension  -Stable on losartan.  Continue    4. Tobacco abuse  -Spent about 3 minutes discussing tobacco cessation        Return in about 1 year (around 2026) for Annual..       Subjective   History:  Seen in office today for annual physical exam.  Overall denies any changes in his health.  Still continues to drink Mountain Dew regularly.  Still continues to smoke regularly.    Not currently sexually active    Review of Systems   Constitutional:  Negative for chills, fever and unexpected weight change.   HENT:  Negative for congestion, dental problem and sore throat.    Eyes:  Negative for pain and discharge.   Respiratory:  Negative for cough, chest tightness and shortness of breath.    Cardiovascular:  Negative for chest pain, palpitations and leg swelling.   Gastrointestinal:  Negative for abdominal distention, abdominal pain, constipation and diarrhea.   Endocrine: Negative for polydipsia, polyphagia and polyuria.   Genitourinary:  Negative for dysuria, flank pain, hematuria and urgency.   Musculoskeletal:  Negative for arthralgias, joint swelling and myalgias.   Skin:  Negative for color change and rash.   Neurological:  Negative for dizziness, light-headedness, numbness and headaches.   Psychiatric/Behavioral:  Negative for agitation and behavioral problems. The patient

## 2025-08-25 DIAGNOSIS — I10 PRIMARY HYPERTENSION: ICD-10-CM

## 2025-08-26 RX ORDER — LOSARTAN POTASSIUM 25 MG/1
25 TABLET ORAL DAILY
Qty: 90 TABLET | Refills: 3 | Status: SHIPPED | OUTPATIENT
Start: 2025-08-26